# Patient Record
Sex: FEMALE | Race: BLACK OR AFRICAN AMERICAN | NOT HISPANIC OR LATINO | URBAN - METROPOLITAN AREA
[De-identification: names, ages, dates, MRNs, and addresses within clinical notes are randomized per-mention and may not be internally consistent; named-entity substitution may affect disease eponyms.]

---

## 2020-07-07 ENCOUNTER — OFFICE VISIT (OUTPATIENT)
Dept: URBAN - METROPOLITAN AREA CLINIC 113 | Facility: CLINIC | Age: 55
End: 2020-07-07

## 2020-07-25 ENCOUNTER — TELEPHONE ENCOUNTER (OUTPATIENT)
Dept: URBAN - METROPOLITAN AREA CLINIC 13 | Facility: CLINIC | Age: 55
End: 2020-07-25

## 2020-07-25 RX ORDER — POLYETHYLENE GLYCOL 3350, SODIUM CHLORIDE, SODIUM BICARBONATE AND POTASSIUM CHLORIDE WITH LEMON FLAVOR 420; 11.2; 5.72; 1.48 G/4L; G/4L; G/4L; G/4L
TAKE 1/2 GALLON AT 5:00 PM DAY BEFORE PROCEDURE, TAKE SECOND 1/2 OF GALLON 6 HRS PRIOR TO PROCEDURE POWDER, FOR SOLUTION ORAL
Qty: 1 | Refills: 0 | OUTPATIENT
Start: 2015-05-27 | End: 2015-08-10

## 2020-07-25 RX ORDER — CHOLECALCIFEROL (VITAMIN D3) 125 MCG
TAKE 1 TABLET DAILY -  PT STATES UNKNOWN DOSE CAPSULE ORAL
Refills: 0 | OUTPATIENT
End: 2016-04-28

## 2020-07-25 RX ORDER — HYDROCORTISONE ACETATE 25 MG/1
INSERT 1 SUPPOSITORY RECTALLY AT BEDTIME AS NEEDED SUPPOSITORY RECTAL
Qty: 14 | Refills: 1 | OUTPATIENT
Start: 2019-01-23 | End: 2020-07-07

## 2020-07-25 RX ORDER — NEOMYCIN AND POLYMYXIN B SULFATES AND DEXAMETHASONE 1; 3.5; 1 MG/G; MG/G; [IU]/G
APPLY 1/2 INCH RIBBON INTO AFFECTED EYE(S) AT BEDTIME OINTMENT OPHTHALMIC
Refills: 0 | OUTPATIENT
End: 2019-01-23

## 2020-07-25 RX ORDER — CHLORHEXIDINE GLUCONATE 4 %
TAKE 1 TABLET TWICE DAILY LIQUID (ML) TOPICAL
Qty: 60 | Refills: 3 | OUTPATIENT
Start: 2016-05-06 | End: 2020-07-07

## 2020-07-25 RX ORDER — FAMOTIDINE 20 MG/1
TAKE 1 TABLET DAILY AS DIRECTED TABLET, FILM COATED ORAL
Refills: 0 | OUTPATIENT
End: 2020-07-07

## 2020-07-25 RX ORDER — ESOMEPRAZOLE MAGNESIUM 20 MG/1
TAKE 1 CAPSULE DAILY CAPSULE, DELAYED RELEASE ORAL
Refills: 0 | OUTPATIENT
End: 2016-04-28

## 2020-07-25 RX ORDER — PROMETHAZINE HYDROCHLORIDE 12.5 MG/1
TAKE 1 TABLET EVERY 6 HOURS AS NEEDED TABLET ORAL
Qty: 12 | Refills: 0 | OUTPATIENT
Start: 2018-12-28 | End: 2019-01-23

## 2020-07-25 RX ORDER — OMEPRAZOLE 20 MG/1
TAKE 1 TABLET DAILY TABLET, DELAYED RELEASE ORAL
Refills: 0 | OUTPATIENT
End: 2016-06-07

## 2020-07-25 RX ORDER — CLINDAMYCIN PHOSPHATE 20 MG/G
INSERT 1 APPLICATORFUL INTRAVAGINALLY AT BEDTIME CREAM VAGINAL
Refills: 0 | OUTPATIENT
End: 2020-07-07

## 2020-07-25 RX ORDER — OMEPRAZOLE 40 MG/1
TAKE 1 CAPSULE DAILY CAPSULE, DELAYED RELEASE ORAL
Qty: 1 | Refills: 11 | OUTPATIENT
End: 2018-10-25

## 2020-07-25 RX ORDER — METRONIDAZOLE 500 MG/1
TAKE 1 TABLET TWICE DAILY PRN TABLET, FILM COATED ORAL
Refills: 0 | OUTPATIENT
End: 2018-04-20

## 2020-07-25 RX ORDER — IBUPROFEN 800 MG/1
TAKE 1 TABLET AS NEEDED TABLET ORAL
Refills: 0 | OUTPATIENT
End: 2015-05-27

## 2020-07-25 RX ORDER — SACCHAROMYCES BOULARDII 250 MG
TAKE 1 CAPSULE TWICE DAILY CAPSULE ORAL
Refills: 0 | OUTPATIENT
End: 2020-07-07

## 2020-07-25 RX ORDER — NITROFURANTOIN MONOHYDRATE/MACROCRYSTALLINE 25; 75 MG/1; MG/1
TAKE 1 CAPSULE TWICE DAILY CAPSULE ORAL
Refills: 0 | OUTPATIENT
End: 2016-05-04

## 2020-07-25 RX ORDER — ESOMEPRAZOLE MAGNESIUM 40 MG/1
TAKE 1 CAPSULE DAILY CAPSULE, DELAYED RELEASE PELLETS ORAL
Qty: 30 | Refills: 3 | OUTPATIENT
Start: 2016-04-28 | End: 2016-04-29

## 2020-07-25 RX ORDER — POLYETHYLENE GLYCOL 3350, SODIUM CHLORIDE, SODIUM BICARBONATE AND POTASSIUM CHLORIDE WITH LEMON FLAVOR 420; 11.2; 5.72; 1.48 G/4L; G/4L; G/4L; G/4L
TAKE 1/2 GALLON AT 5:00 PM DAY BEFORE PROCEDURE, TAKE SECOND 1/2 OF GALLON 6 HRS PRIOR TO PROCEDURE POWDER, FOR SOLUTION ORAL
Qty: 1 | Refills: 0 | OUTPATIENT
Start: 2018-12-28 | End: 2018-12-31

## 2020-07-25 RX ORDER — PANTOPRAZOLE SODIUM 40 MG/1
TAKE 1 TABLET DAILY TABLET, DELAYED RELEASE ORAL
Qty: 30 | Refills: 2 | OUTPATIENT
Start: 2018-10-25 | End: 2019-01-23

## 2020-07-25 RX ORDER — HYDROCODONE/ACETAMINOPHEN 5 MG-325MG
TAKE 1 TABLET EVERY 4 TO 6 HOURS AS NEEDED TABLET ORAL
Refills: 0 | OUTPATIENT
End: 2016-04-28

## 2020-07-25 RX ORDER — OMEPRAZOLE 40 MG/1
TAKE 1 CAPSULE TWICE DAILY 30 MINUTES BEFORE BREAKFAST AND DINNER CAPSULE, DELAYED RELEASE ORAL
Qty: 60 | Refills: 2 | OUTPATIENT
Start: 2016-04-28 | End: 2016-06-07

## 2020-07-25 RX ORDER — MAGNESIUM HYDROXIDE 400 MG/5ML
USE AS DIRECTED SUSPENSION ORAL
Refills: 0 | OUTPATIENT
Start: 2008-05-01 | End: 2016-04-28

## 2020-07-25 RX ORDER — POLYETHYLENE GLYCOL 3350, SODIUM CHLORIDE, SODIUM BICARBONATE AND POTASSIUM CHLORIDE WITH LEMON FLAVOR 420; 11.2; 5.72; 1.48 G/4L; G/4L; G/4L; G/4L
USE AS DIRECTED POWDER, FOR SOLUTION ORAL
Qty: 1 | Refills: 0 | OUTPATIENT
Start: 2018-10-25 | End: 2018-12-17

## 2020-07-25 RX ORDER — FLUCONAZOLE 150 MG/1
TAKE 1 TABLET 1 TIME ONLY TABLET ORAL
Refills: 0 | OUTPATIENT
End: 2017-02-07

## 2020-07-26 ENCOUNTER — TELEPHONE ENCOUNTER (OUTPATIENT)
Dept: URBAN - METROPOLITAN AREA CLINIC 13 | Facility: CLINIC | Age: 55
End: 2020-07-26

## 2020-07-26 RX ORDER — FLUCONAZOLE 150 MG/1
TAKE 1 TABLET NOW AND AGAIN IN 3 DAYS AS NEEDED TABLET ORAL
Refills: 0 | Status: ACTIVE | COMMUNITY

## 2020-07-26 RX ORDER — LATANOPROST/PF 0.005 %
DROPS OPHTHALMIC (EYE)
Qty: 2 | Refills: 0 | Status: ACTIVE | COMMUNITY
Start: 2018-06-20

## 2020-07-26 RX ORDER — FUROSEMIDE 20 MG/1
TAKE 1  TO 2 TABLETS DAILY AS NEEDED TABLET ORAL
Qty: 30 | Refills: 6 | Status: ACTIVE | COMMUNITY

## 2020-07-26 RX ORDER — TOBRAMYCIN AND DEXAMETHASONE 3; 1 MG/ML; MG/ML
SUSPENSION/ DROPS OPHTHALMIC
Qty: 10 | Refills: 0 | Status: ACTIVE | COMMUNITY
Start: 2018-07-12

## 2020-07-26 RX ORDER — CETIRIZINE HYDROCHLORIDE 10 MG/1
TAKE 1 TABLET DAILY AS NEEDED. DOSE UNKNOWN PER PT TABLET, FILM COATED ORAL
Refills: 0 | Status: ACTIVE | COMMUNITY

## 2020-07-26 RX ORDER — MOXIFLOXACIN HYDROCHLORIDE TABLETS, 400 MG 400 MG/1
TABLET, FILM COATED ORAL
Qty: 14 | Refills: 0 | Status: ACTIVE | COMMUNITY
Start: 2018-03-27

## 2020-07-26 RX ORDER — ALPRAZOLAM 0.25 MG/1
TABLET ORAL
Qty: 50 | Refills: 0 | Status: ACTIVE | COMMUNITY
Start: 2011-09-27

## 2020-07-26 RX ORDER — TRAMADOL HYDROCHLORIDE 50 MG/1
TAKE 1 TABLET EVERY 6 HOURS AS NEEDED FOR PAIN TABLET ORAL
Refills: 0 | Status: ACTIVE | COMMUNITY

## 2020-07-26 RX ORDER — CARVEDILOL 3.12 MG/1
TAKE 1 TABLET TWICE DAILY WITH MEALS TABLET, FILM COATED ORAL
Refills: 0 | Status: ACTIVE | COMMUNITY

## 2020-07-26 RX ORDER — DOCUSATE SODIUM 100 MG/1
TAKE 1 CAPSULE TWICE DAILY AS NEEDED CAPSULE ORAL
Qty: 60 | Refills: 11 | Status: ACTIVE | COMMUNITY

## 2020-07-26 RX ORDER — DOXYCYCLINE 100 MG/1
CAPSULE ORAL
Qty: 20 | Refills: 0 | Status: ACTIVE | COMMUNITY
Start: 2014-07-24

## 2020-07-26 RX ORDER — LATANOPROST/PF 0.005 %
DROPS OPHTHALMIC (EYE)
Qty: 2 | Refills: 0 | Status: ACTIVE | COMMUNITY
Start: 2018-05-09

## 2020-07-26 RX ORDER — PROMETHAZINE HYDROCHLORIDE 12.5 MG/1
TAKE 1 TABLET EVERY 6 HOURS AS NEEDED FOR NAUSEA TABLET ORAL
Qty: 30 | Refills: 1 | Status: ACTIVE | COMMUNITY
Start: 2016-04-28

## 2020-07-26 RX ORDER — CLOTRIMAZOLE AND BETAMETHASONE DIPROPIONATE 10; .5 MG/G; MG/G
CREAM TOPICAL
Qty: 60 | Refills: 0 | Status: ACTIVE | COMMUNITY
Start: 2018-05-08

## 2020-07-26 RX ORDER — NEOMYCIN AND POLYMYXIN B SULFATES AND DEXAMETHASONE 1; 3.5; 1 MG/G; MG/G; [IU]/G
OINTMENT OPHTHALMIC
Qty: 4 | Refills: 0 | Status: ACTIVE | COMMUNITY
Start: 2018-07-24

## 2020-07-26 RX ORDER — CLINDAMYCIN PHOSPHATE 20 MG/G
CREAM VAGINAL
Qty: 40 | Refills: 0 | Status: ACTIVE | COMMUNITY
Start: 2018-05-22

## 2020-07-26 RX ORDER — PRASTERONE 6.5 MG/1
INSERT 1 SUPP BEDTIME INSERT VAGINAL
Refills: 0 | Status: ACTIVE | COMMUNITY

## 2020-07-26 RX ORDER — METRONIDAZOLE 500 MG/1
TABLET ORAL
Qty: 21 | Refills: 0 | Status: ACTIVE | COMMUNITY
Start: 2012-05-21

## 2020-07-26 RX ORDER — MOXIFLOXACIN HYDROCHLORIDE TABLETS, 400 MG 400 MG/1
TK 1 T PO QD TABLET, FILM COATED ORAL
Qty: 14 | Refills: 0 | Status: ACTIVE | COMMUNITY
Start: 2018-03-24

## 2020-07-26 RX ORDER — HYDROCHLOROTHIAZIDE 25 MG/1
TAKE 1 TABLET DAILY TABLET ORAL
Refills: 0 | Status: ACTIVE | COMMUNITY

## 2020-07-26 RX ORDER — LINEZOLID 600 MG/1
TABLET ORAL
Qty: 28 | Refills: 0 | Status: ACTIVE | COMMUNITY
Start: 2018-04-03

## 2020-07-26 RX ORDER — CLINDAMYCIN PHOSPHATE 20 MG/G
CREAM VAGINAL
Qty: 40 | Refills: 0 | Status: ACTIVE | COMMUNITY
Start: 2018-07-15

## 2020-07-26 RX ORDER — CLOTRIMAZOLE AND BETAMETHASONE DIPROPIONATE 10; .5 MG/G; MG/G
CREAM TOPICAL
Qty: 45 | Refills: 0 | Status: ACTIVE | COMMUNITY
Start: 2011-12-02

## 2020-07-26 RX ORDER — DICYCLOMINE HYDROCHLORIDE 10 MG/1
TAKE 1 CAPSULE EVERY 6 HOURS PRN ABDOMINAL PAIN CAPSULE ORAL
Qty: 60 | Refills: 3 | Status: ACTIVE | COMMUNITY
Start: 2020-07-07

## 2020-07-26 RX ORDER — METFORMIN HYDROCHLORIDE 500 MG/1
TAKE 1 TABLET TWICE DAILY WITH FOOD TABLET, COATED ORAL
Refills: 0 | Status: ACTIVE | COMMUNITY

## 2020-07-26 RX ORDER — FUROSEMIDE 20 MG/1
TABLET ORAL
Qty: 60 | Refills: 0 | Status: ACTIVE | COMMUNITY
Start: 2018-05-22

## 2020-07-26 RX ORDER — FLUCONAZOLE 150 MG/1
TABLET ORAL
Qty: 4 | Refills: 0 | Status: ACTIVE | COMMUNITY
Start: 2014-06-25

## 2020-07-26 RX ORDER — HYDROCHLOROTHIAZIDE 25 MG/1
TABLET ORAL
Qty: 30 | Refills: 0 | Status: ACTIVE | COMMUNITY
Start: 2011-12-02

## 2020-07-26 RX ORDER — HYDROCHLOROTHIAZIDE 25 MG/1
TABLET ORAL
Qty: 30 | Refills: 0 | Status: ACTIVE | COMMUNITY
Start: 2014-06-21

## 2020-10-14 ENCOUNTER — OFFICE VISIT (OUTPATIENT)
Dept: URBAN - METROPOLITAN AREA CLINIC 113 | Facility: CLINIC | Age: 55
End: 2020-10-14

## 2021-10-26 ENCOUNTER — OFFICE VISIT (OUTPATIENT)
Dept: URBAN - METROPOLITAN AREA CLINIC 113 | Facility: CLINIC | Age: 56
End: 2021-10-26
Payer: COMMERCIAL

## 2021-10-26 ENCOUNTER — WEB ENCOUNTER (OUTPATIENT)
Dept: URBAN - METROPOLITAN AREA CLINIC 113 | Facility: CLINIC | Age: 56
End: 2021-10-26

## 2021-10-26 VITALS
TEMPERATURE: 97.3 F | BODY MASS INDEX: 48.82 KG/M2 | RESPIRATION RATE: 20 BRPM | WEIGHT: 293 LBS | DIASTOLIC BLOOD PRESSURE: 81 MMHG | HEIGHT: 65 IN | SYSTOLIC BLOOD PRESSURE: 172 MMHG | HEART RATE: 74 BPM

## 2021-10-26 DIAGNOSIS — K58.2 IRRITABLE BOWEL SYNDROME WITH BOTH CONSTIPATION AND DIARRHEA: ICD-10-CM

## 2021-10-26 PROBLEM — 10743008: Status: ACTIVE | Noted: 2021-10-26

## 2021-10-26 PROCEDURE — 99213 OFFICE O/P EST LOW 20 MIN: CPT | Performed by: NURSE PRACTITIONER

## 2021-10-26 RX ORDER — DOCUSATE SODIUM 100 MG/1
TAKE 1 CAPSULE TWICE DAILY AS NEEDED CAPSULE ORAL
Qty: 60 | Refills: 11 | Status: ACTIVE | COMMUNITY

## 2021-10-26 RX ORDER — MOXIFLOXACIN HYDROCHLORIDE TABLETS, 400 MG 400 MG/1
TK 1 T PO QD TABLET, FILM COATED ORAL
Qty: 14 | Refills: 0 | Status: ON HOLD | COMMUNITY
Start: 2018-03-24

## 2021-10-26 RX ORDER — PRASTERONE 6.5 MG/1
_INSERT 1 SUPP BEDTIME INSERT VAGINAL
Refills: 0 | Status: ACTIVE | COMMUNITY

## 2021-10-26 RX ORDER — CLINDAMYCIN PHOSPHATE 20 MG/G
CREAM VAGINAL
Qty: 40 | Refills: 0 | Status: ACTIVE | COMMUNITY
Start: 2018-05-22

## 2021-10-26 RX ORDER — HYDROCHLOROTHIAZIDE 25 MG/1
TAKE 1 TABLET DAILY TABLET ORAL
Refills: 0 | Status: ACTIVE | COMMUNITY

## 2021-10-26 RX ORDER — METFORMIN HYDROCHLORIDE 500 MG/1
TAKE 1 TABLET TWICE DAILY WITH FOOD TABLET, COATED ORAL
Refills: 0 | Status: ACTIVE | COMMUNITY

## 2021-10-26 RX ORDER — LINEZOLID 600 MG/1
TABLET ORAL
Qty: 28 | Refills: 0 | Status: ON HOLD | COMMUNITY
Start: 2018-04-03

## 2021-10-26 RX ORDER — FUROSEMIDE 20 MG/1
TAKE 1  TO 2 TABLETS DAILY AS NEEDED TABLET ORAL
Qty: 30 | Refills: 6 | Status: ACTIVE | COMMUNITY

## 2021-10-26 RX ORDER — CETIRIZINE HYDROCHLORIDE 10 MG/1
TAKE 1 TABLET DAILY AS NEEDED. DOSE UNKNOWN PER PT TABLET, FILM COATED ORAL
Refills: 0 | Status: ACTIVE | COMMUNITY

## 2021-10-26 RX ORDER — METRONIDAZOLE 500 MG/1
TABLET ORAL
Qty: 21 | Refills: 0 | Status: ACTIVE | COMMUNITY
Start: 2012-05-21

## 2021-10-26 RX ORDER — PROMETHAZINE HYDROCHLORIDE 12.5 MG/1
TAKE 1 TABLET EVERY 6 HOURS AS NEEDED FOR NAUSEA TABLET ORAL
Qty: 30 | Refills: 1 | Status: ACTIVE | COMMUNITY
Start: 2016-04-28

## 2021-10-26 RX ORDER — CARVEDILOL 3.12 MG/1
TAKE 1 TABLET TWICE DAILY WITH MEALS TABLET, FILM COATED ORAL
Refills: 0 | Status: ACTIVE | COMMUNITY

## 2021-10-26 RX ORDER — TOBRAMYCIN AND DEXAMETHASONE 3; 1 MG/ML; MG/ML
SUSPENSION/ DROPS OPHTHALMIC
Qty: 10 | Refills: 0 | Status: ON HOLD | COMMUNITY
Start: 2018-07-12

## 2021-10-26 RX ORDER — TRAMADOL HYDROCHLORIDE 50 MG/1
TAKE 1 TABLET EVERY 6 HOURS AS NEEDED FOR PAIN TABLET ORAL
Refills: 0 | Status: ACTIVE | COMMUNITY

## 2021-10-26 RX ORDER — DOXYCYCLINE 100 MG/1
CAPSULE ORAL
Qty: 20 | Refills: 0 | Status: ON HOLD | COMMUNITY
Start: 2014-07-24

## 2021-10-26 RX ORDER — DICYCLOMINE HYDROCHLORIDE 10 MG/1
TAKE 1 CAPSULE EVERY 6 HOURS PRN ABDOMINAL PAIN CAPSULE ORAL
Qty: 60 | Refills: 3 | Status: ACTIVE | COMMUNITY
Start: 2020-07-07

## 2021-10-26 RX ORDER — FLUOXETINE HYDROCHLORIDE 10 MG/1
1 TABLET TABLET ORAL ONCE A DAY
Status: ACTIVE | COMMUNITY

## 2021-10-26 RX ORDER — CLOTRIMAZOLE AND BETAMETHASONE DIPROPIONATE 10; .5 MG/G; MG/G
CREAM TOPICAL
Qty: 45 | Refills: 0 | Status: ACTIVE | COMMUNITY
Start: 2011-12-02

## 2021-10-26 RX ORDER — FLUCONAZOLE 150 MG/1
TAKE 1 TABLET NOW AND AGAIN IN 3 DAYS AS NEEDED TABLET ORAL
Refills: 0 | Status: ACTIVE | COMMUNITY

## 2021-10-26 RX ORDER — FLUCONAZOLE 150 MG/1
TABLET ORAL
Qty: 4 | Refills: 0 | Status: ON HOLD | COMMUNITY
Start: 2014-06-25

## 2021-10-26 RX ORDER — NEOMYCIN AND POLYMYXIN B SULFATES AND DEXAMETHASONE 1; 3.5; 1 MG/G; MG/G; [IU]/G
OINTMENT OPHTHALMIC
Qty: 4 | Refills: 0 | Status: ON HOLD | COMMUNITY
Start: 2018-07-24

## 2021-10-26 RX ORDER — LATANOPROST/PF 0.005 %
DROPS OPHTHALMIC (EYE)
Qty: 2 | Refills: 0 | Status: ON HOLD | COMMUNITY
Start: 2018-05-09

## 2021-10-26 RX ORDER — ALPRAZOLAM 0.25 MG/1
1 TABLET TABLET ORAL
Refills: 0 | Status: ON HOLD | COMMUNITY
Start: 2011-09-27

## 2021-10-26 NOTE — HPI-TODAY'S VISIT:
57 yo woman presenting for same day appointment regarding "IBS flare."  She has a past medical history of GERD, nausea and vomiting, adenomatous colon polyps due surveillance in 2023, chronic abdominal pain and constiaption associated with a functional bowel disorder presenting for long interval follow up.  She was last seen in the office in July 2020 for chronic abdominal complaints consistent with IBS. She was to resume daily fiber supplement, begin a  daily probiotic, use MiraLAX daily, and avoid dairy products. She was provided dicyclomine to use as needed. Additionally, she was asked to complete a CBC to trend hemoglobin for history of anemia.  She tells me that over the last 6 months, she has been having worsening mid abdominal pain prompting the urge for a bowel movement. She tells me that she can have dry heaves associated with the abdominal pain. She can also have loose stools with incontinence. There is no dysphagia. No heartburn, but she can have a reflux sensation. She is not taking famotine. There is nausea, that has been ongoing daily for the last 2 weeks, usually all day. She has had dry heaves approximately every day for the last 2 weeks. Some days she does not have retching. No vomiting of food contents or bile. She has bowel movements irregularly. She has days without a bowel movements, followed by days with loose stools, urgency and even incontinence. No blood per rectum. There is no fever or chills. No weight loss. Appetite is fair. Dicyclomine does help with abdominal pain. She had stopped taking her Benefiber for sometime. She did resume this 2 months ago, but remains inconsistent. She takes magnesium citrate as needed.

## 2022-01-18 ENCOUNTER — OFFICE VISIT (OUTPATIENT)
Dept: URBAN - METROPOLITAN AREA CLINIC 113 | Facility: CLINIC | Age: 57
End: 2022-01-18

## 2022-02-22 ENCOUNTER — OFFICE VISIT (OUTPATIENT)
Dept: URBAN - METROPOLITAN AREA CLINIC 113 | Facility: CLINIC | Age: 57
End: 2022-02-22
Payer: COMMERCIAL

## 2022-02-22 VITALS
SYSTOLIC BLOOD PRESSURE: 181 MMHG | HEART RATE: 86 BPM | TEMPERATURE: 97.5 F | HEIGHT: 65 IN | BODY MASS INDEX: 48.82 KG/M2 | DIASTOLIC BLOOD PRESSURE: 91 MMHG | WEIGHT: 293 LBS

## 2022-02-22 DIAGNOSIS — K21.9 GERD WITHOUT ESOPHAGITIS: ICD-10-CM

## 2022-02-22 DIAGNOSIS — Z86.010 HISTORY OF ADENOMATOUS POLYP OF COLON: ICD-10-CM

## 2022-02-22 DIAGNOSIS — K59.01 SLOW TRANSIT CONSTIPATION: ICD-10-CM

## 2022-02-22 DIAGNOSIS — R14.0 BLOATING: ICD-10-CM

## 2022-02-22 PROCEDURE — 99213 OFFICE O/P EST LOW 20 MIN: CPT | Performed by: INTERNAL MEDICINE

## 2022-02-22 RX ORDER — CLOTRIMAZOLE AND BETAMETHASONE DIPROPIONATE 10; .5 MG/G; MG/G
CREAM TOPICAL
Qty: 45 | Refills: 0 | Status: ACTIVE | COMMUNITY
Start: 2011-12-02

## 2022-02-22 RX ORDER — FLUCONAZOLE 150 MG/1
TAKE 1 TABLET NOW AND AGAIN IN 3 DAYS AS NEEDED TABLET ORAL
Refills: 0 | Status: ACTIVE | COMMUNITY

## 2022-02-22 RX ORDER — CARVEDILOL 3.12 MG/1
TAKE 1 TABLET TWICE DAILY WITH MEALS TABLET, FILM COATED ORAL
Refills: 0 | Status: ACTIVE | COMMUNITY

## 2022-02-22 RX ORDER — MOXIFLOXACIN HYDROCHLORIDE TABLETS, 400 MG 400 MG/1
TK 1 T PO QD TABLET, FILM COATED ORAL
Qty: 14 | Refills: 0 | Status: ON HOLD | COMMUNITY
Start: 2018-03-24

## 2022-02-22 RX ORDER — CETIRIZINE HYDROCHLORIDE 10 MG/1
TAKE 1 TABLET DAILY AS NEEDED. DOSE UNKNOWN PER PT TABLET, FILM COATED ORAL
Refills: 0 | Status: ACTIVE | COMMUNITY

## 2022-02-22 RX ORDER — FLUCONAZOLE 150 MG/1
TABLET ORAL
Qty: 4 | Refills: 0 | Status: ON HOLD | COMMUNITY
Start: 2014-06-25

## 2022-02-22 RX ORDER — DOCUSATE SODIUM 100 MG/1
TAKE 1 CAPSULE TWICE DAILY AS NEEDED CAPSULE ORAL
Qty: 60 | Refills: 11 | Status: ACTIVE | COMMUNITY

## 2022-02-22 RX ORDER — NEOMYCIN AND POLYMYXIN B SULFATES AND DEXAMETHASONE 1; 3.5; 1 MG/G; MG/G; [IU]/G
OINTMENT OPHTHALMIC
Qty: 4 | Refills: 0 | Status: ON HOLD | COMMUNITY
Start: 2018-07-24

## 2022-02-22 RX ORDER — FLUOXETINE HYDROCHLORIDE 10 MG/1
1 TABLET TABLET ORAL ONCE A DAY
Status: ACTIVE | COMMUNITY

## 2022-02-22 RX ORDER — NAPROXEN SODIUM 220 MG/1
1 TABLET WITH FOOD OR MILK AS NEEDED TABLET ORAL
Status: ACTIVE | COMMUNITY

## 2022-02-22 RX ORDER — TOBRAMYCIN AND DEXAMETHASONE 3; 1 MG/ML; MG/ML
SUSPENSION/ DROPS OPHTHALMIC
Qty: 10 | Refills: 0 | Status: ON HOLD | COMMUNITY
Start: 2018-07-12

## 2022-02-22 RX ORDER — OMEPRAZOLE 40 MG/1
1 CAPSULE 30 MINUTES BEFORE MORNING MEAL CAPSULE, DELAYED RELEASE ORAL ONCE A DAY
Qty: 30 | Refills: 5 | OUTPATIENT
Start: 2022-02-22

## 2022-02-22 RX ORDER — DOXYCYCLINE 100 MG/1
CAPSULE ORAL
Qty: 20 | Refills: 0 | Status: ON HOLD | COMMUNITY
Start: 2014-07-24

## 2022-02-22 RX ORDER — DICYCLOMINE HYDROCHLORIDE 10 MG/1
TAKE 1 CAPSULE EVERY 6 HOURS PRN ABDOMINAL PAIN CAPSULE ORAL
Qty: 60 | Refills: 3 | Status: ACTIVE | COMMUNITY
Start: 2020-07-07

## 2022-02-22 RX ORDER — WHEAT DEXTRIN 3 G/3.5 G
AS DIRECTED POWDER (GRAM) ORAL
Status: ACTIVE | COMMUNITY

## 2022-02-22 RX ORDER — ALPRAZOLAM 0.25 MG/1
1 TABLET TABLET ORAL
Refills: 0 | Status: ON HOLD | COMMUNITY
Start: 2011-09-27

## 2022-02-22 RX ORDER — PRASTERONE 6.5 MG/1
_INSERT 1 SUPP BEDTIME INSERT VAGINAL
Refills: 0 | Status: ACTIVE | COMMUNITY

## 2022-02-22 RX ORDER — LATANOPROST/PF 0.005 %
DROPS OPHTHALMIC (EYE)
Qty: 2 | Refills: 0 | Status: ON HOLD | COMMUNITY
Start: 2018-05-09

## 2022-02-22 RX ORDER — TRAMADOL HYDROCHLORIDE 50 MG/1
TAKE 1 TABLET EVERY 6 HOURS AS NEEDED FOR PAIN TABLET ORAL
Refills: 0 | Status: ACTIVE | COMMUNITY

## 2022-02-22 RX ORDER — METRONIDAZOLE 500 MG/1
TABLET ORAL
Qty: 21 | Refills: 0 | Status: ACTIVE | COMMUNITY
Start: 2012-05-21

## 2022-02-22 RX ORDER — FUROSEMIDE 20 MG/1
TAKE 1  TO 2 TABLETS DAILY AS NEEDED TABLET ORAL
Qty: 30 | Refills: 6 | Status: ACTIVE | COMMUNITY

## 2022-02-22 RX ORDER — LINEZOLID 600 MG/1
TABLET ORAL
Qty: 28 | Refills: 0 | Status: ON HOLD | COMMUNITY
Start: 2018-04-03

## 2022-02-22 RX ORDER — HYDROCHLOROTHIAZIDE 25 MG/1
TAKE 1 TABLET DAILY TABLET ORAL
Refills: 0 | Status: ACTIVE | COMMUNITY

## 2022-02-22 RX ORDER — PROMETHAZINE HYDROCHLORIDE 12.5 MG/1
TAKE 1 TABLET EVERY 6 HOURS AS NEEDED FOR NAUSEA TABLET ORAL
Qty: 30 | Refills: 1 | Status: ACTIVE | COMMUNITY
Start: 2016-04-28

## 2022-02-22 RX ORDER — POLYETHYLENE GLYCOL 3350 17 G/17G
AS DIRECTED POWDER, FOR SOLUTION ORAL
Status: ACTIVE | COMMUNITY

## 2022-02-22 RX ORDER — CLINDAMYCIN PHOSPHATE 20 MG/G
CREAM VAGINAL
Qty: 40 | Refills: 0 | Status: ACTIVE | COMMUNITY
Start: 2018-05-22

## 2022-02-22 RX ORDER — METFORMIN HYDROCHLORIDE 500 MG/1
TAKE 1 TABLET TWICE DAILY WITH FOOD TABLET, COATED ORAL
Refills: 0 | Status: ACTIVE | COMMUNITY

## 2022-02-22 NOTE — HPI-TODAY'S VISIT:
57-year-old with a history of GERD, nausea and vomiting, adenomatous colon polyps due for surveillance in 2023, chronic abdominal pain and constipation associated with functional bowel disorder. For the last 2 months she has had increased reflux symptoms she states.  She gets discomfort in the epigastric region.  Indigestion.  Sometimes she gags and at his slight bile.  She denies any heartburn or dysphagia.  She's had no regurgitation.  She occasionally has nausea but no vomiting.  She is using Pepcid twice a day.  She has had some constipation but doesn't move her bowels about every other day.  She is getting back on her MiraLAX and restarted that.  She plans to restart her fiber as well.  There's been no bright red blood per rectum or melena.  She did have one time where she had some right red blood on the toilet paper when she wiped but never in the toilet bowl.  She has a lot of stress recently.  Sometimes she can have some urgency with her stools.  She has a lot of gas bloating and flatus. Blood workup 2//22 revealed a hemoglobin 11.1 WBC is 7.3 and platelet count of 200,000.  Sodium is 138 potassium 4.3 BUN 17 creatinine 0.83.  AST 14 ALT 20 alkaline phosphatase is 58 and bili 0.2.  TSH is 2.39.  Hemoglobin A1c is 8.8.

## 2022-02-22 NOTE — HPI-OTHER HISTORIES
Colonoscopy on 12/31/2018 revealed a moderate redundant colon, an adult colonoscope was used.  She had pancolonic diverticuli, no colon polyps.  EGD on 12/17/2018 revealed a normal Z-line and esophagus at 40 cm a small hiatal hernia.  Mild erythema of the gastric antrum and body.  Normal duodenum.  Stomach biopsies revealed chemical gastropathy negative for H. pylori.  Duodenal biopsies were normal.  Esophageal biopsies revealed reflux changes with no eosinophilic esophagitis.

## 2022-03-16 ENCOUNTER — ERX REFILL RESPONSE (OUTPATIENT)
Dept: URBAN - METROPOLITAN AREA CLINIC 113 | Facility: CLINIC | Age: 57
End: 2022-03-16

## 2022-03-16 RX ORDER — OMEPRAZOLE 40 MG/1
TAKE 1 CAPSULE BY MOUTH EVERY DAY 30 MINUTES BEFORE MORNING MEAL FOR 30 DAYS CAPSULE, DELAYED RELEASE ORAL
Qty: 30 CAPSULE | Refills: 6 | OUTPATIENT

## 2022-03-16 RX ORDER — OMEPRAZOLE 40 MG/1
1 CAPSULE 30 MINUTES BEFORE MORNING MEAL CAPSULE, DELAYED RELEASE ORAL ONCE A DAY
Qty: 30 | Refills: 5 | OUTPATIENT

## 2022-04-22 ENCOUNTER — OFFICE VISIT (OUTPATIENT)
Dept: URBAN - METROPOLITAN AREA CLINIC 113 | Facility: CLINIC | Age: 57
End: 2022-04-22

## 2022-06-06 ENCOUNTER — OFFICE VISIT (OUTPATIENT)
Dept: URBAN - METROPOLITAN AREA CLINIC 113 | Facility: CLINIC | Age: 57
End: 2022-06-06
Payer: COMMERCIAL

## 2022-06-06 VITALS
BODY MASS INDEX: 48.82 KG/M2 | DIASTOLIC BLOOD PRESSURE: 75 MMHG | HEART RATE: 98 BPM | SYSTOLIC BLOOD PRESSURE: 157 MMHG | WEIGHT: 293 LBS | TEMPERATURE: 98.9 F | HEIGHT: 65 IN

## 2022-06-06 DIAGNOSIS — K59.01 SLOW TRANSIT CONSTIPATION: ICD-10-CM

## 2022-06-06 DIAGNOSIS — Z86.010 HISTORY OF ADENOMATOUS POLYP OF COLON: ICD-10-CM

## 2022-06-06 DIAGNOSIS — R14.0 BLOATING: ICD-10-CM

## 2022-06-06 DIAGNOSIS — K21.9 GERD WITHOUT ESOPHAGITIS: ICD-10-CM

## 2022-06-06 DIAGNOSIS — K59.09 CHANGE IN BOWEL MOVEMENTS INTERMITTENT CONSTIPATION. URGENCY IN THE MORNING.: ICD-10-CM

## 2022-06-06 PROCEDURE — 99213 OFFICE O/P EST LOW 20 MIN: CPT | Performed by: INTERNAL MEDICINE

## 2022-06-06 RX ORDER — MOXIFLOXACIN HYDROCHLORIDE TABLETS, 400 MG 400 MG/1
TK 1 T PO QD TABLET, FILM COATED ORAL
Qty: 14 | Refills: 0 | Status: ON HOLD | COMMUNITY
Start: 2018-03-24

## 2022-06-06 RX ORDER — PROMETHAZINE HYDROCHLORIDE 12.5 MG/1
TAKE 1 TABLET EVERY 6 HOURS AS NEEDED FOR NAUSEA TABLET ORAL
Qty: 30 | Refills: 1 | Status: ACTIVE | COMMUNITY
Start: 2016-04-28

## 2022-06-06 RX ORDER — PRASTERONE 6.5 MG/1
_INSERT 1 SUPP BEDTIME INSERT VAGINAL
Refills: 0 | Status: ACTIVE | COMMUNITY

## 2022-06-06 RX ORDER — CLINDAMYCIN PHOSPHATE 20 MG/G
CREAM VAGINAL
Qty: 40 | Refills: 0 | Status: ACTIVE | COMMUNITY
Start: 2018-05-22

## 2022-06-06 RX ORDER — LINEZOLID 600 MG/1
TABLET ORAL
Qty: 28 | Refills: 0 | Status: ON HOLD | COMMUNITY
Start: 2018-04-03

## 2022-06-06 RX ORDER — NEOMYCIN AND POLYMYXIN B SULFATES AND DEXAMETHASONE 1; 3.5; 1 MG/G; MG/G; [IU]/G
OINTMENT OPHTHALMIC
Qty: 4 | Refills: 0 | Status: ON HOLD | COMMUNITY
Start: 2018-07-24

## 2022-06-06 RX ORDER — DICYCLOMINE HYDROCHLORIDE 10 MG/1
TAKE 1 CAPSULE EVERY 6 HOURS PRN ABDOMINAL PAIN CAPSULE ORAL
Qty: 60 | Refills: 3 | Status: ACTIVE | COMMUNITY
Start: 2020-07-07

## 2022-06-06 RX ORDER — OMEPRAZOLE 40 MG/1
TAKE 1 CAPSULE BY MOUTH EVERY DAY 30 MINUTES BEFORE MORNING MEAL FOR 30 DAYS CAPSULE, DELAYED RELEASE ORAL
Qty: 30 CAPSULE | Refills: 6 | Status: ACTIVE | COMMUNITY

## 2022-06-06 RX ORDER — ALPRAZOLAM 0.25 MG/1
1 TABLET TABLET ORAL
Refills: 0 | Status: ON HOLD | COMMUNITY
Start: 2011-09-27

## 2022-06-06 RX ORDER — HYDROCHLOROTHIAZIDE 25 MG/1
TAKE 1 TABLET DAILY TABLET ORAL
Refills: 0 | Status: ACTIVE | COMMUNITY

## 2022-06-06 RX ORDER — CARVEDILOL 3.12 MG/1
TAKE 1 TABLET TWICE DAILY WITH MEALS TABLET, FILM COATED ORAL
Refills: 0 | Status: ACTIVE | COMMUNITY

## 2022-06-06 RX ORDER — DOXYCYCLINE 100 MG/1
CAPSULE ORAL
Qty: 20 | Refills: 0 | Status: ON HOLD | COMMUNITY
Start: 2014-07-24

## 2022-06-06 RX ORDER — DOCUSATE SODIUM 100 MG/1
TAKE 1 CAPSULE TWICE DAILY AS NEEDED CAPSULE ORAL
Qty: 60 | Refills: 11 | Status: ACTIVE | COMMUNITY

## 2022-06-06 RX ORDER — CLOTRIMAZOLE AND BETAMETHASONE DIPROPIONATE 10; .5 MG/G; MG/G
CREAM TOPICAL
Qty: 45 | Refills: 0 | Status: ACTIVE | COMMUNITY
Start: 2011-12-02

## 2022-06-06 RX ORDER — FLUOXETINE HYDROCHLORIDE 10 MG/1
1 TABLET TABLET ORAL ONCE A DAY
Status: ACTIVE | COMMUNITY

## 2022-06-06 RX ORDER — NAPROXEN SODIUM 220 MG/1
1 TABLET WITH FOOD OR MILK AS NEEDED TABLET ORAL
Status: ON HOLD | COMMUNITY

## 2022-06-06 RX ORDER — WHEAT DEXTRIN 3 G/3.5 G
AS DIRECTED POWDER (GRAM) ORAL
Status: ACTIVE | COMMUNITY

## 2022-06-06 RX ORDER — TRAMADOL HYDROCHLORIDE 50 MG/1
TAKE 1 TABLET EVERY 6 HOURS AS NEEDED FOR PAIN TABLET ORAL
Refills: 0 | Status: ON HOLD | COMMUNITY

## 2022-06-06 RX ORDER — POLYETHYLENE GLYCOL 3350 17 G/17G
AS DIRECTED POWDER, FOR SOLUTION ORAL
Status: ACTIVE | COMMUNITY

## 2022-06-06 RX ORDER — LATANOPROST/PF 0.005 %
DROPS OPHTHALMIC (EYE)
Qty: 2 | Refills: 0 | Status: ON HOLD | COMMUNITY
Start: 2018-05-09

## 2022-06-06 RX ORDER — CETIRIZINE HYDROCHLORIDE 10 MG/1
TAKE 1 TABLET DAILY AS NEEDED. DOSE UNKNOWN PER PT TABLET, FILM COATED ORAL
Refills: 0 | Status: ACTIVE | COMMUNITY

## 2022-06-06 RX ORDER — FUROSEMIDE 20 MG/1
TAKE 1  TO 2 TABLETS DAILY AS NEEDED TABLET ORAL
Qty: 30 | Refills: 6 | Status: ACTIVE | COMMUNITY

## 2022-06-06 RX ORDER — METFORMIN HYDROCHLORIDE 500 MG/1
TAKE 1 TABLET TWICE DAILY WITH FOOD TABLET, COATED ORAL
Refills: 0 | Status: ACTIVE | COMMUNITY

## 2022-06-06 RX ORDER — TOBRAMYCIN AND DEXAMETHASONE 3; 1 MG/ML; MG/ML
SUSPENSION/ DROPS OPHTHALMIC
Qty: 10 | Refills: 0 | Status: ON HOLD | COMMUNITY
Start: 2018-07-12

## 2022-06-06 RX ORDER — METRONIDAZOLE 500 MG/1
TABLET ORAL
Qty: 21 | Refills: 0 | Status: ON HOLD | COMMUNITY
Start: 2012-05-21

## 2022-06-06 RX ORDER — FLUCONAZOLE 150 MG/1
TAKE 1 TABLET NOW AND AGAIN IN 3 DAYS AS NEEDED TABLET ORAL
Refills: 0 | Status: ACTIVE | COMMUNITY

## 2022-06-06 RX ORDER — FLUCONAZOLE 150 MG/1
TABLET ORAL
Qty: 4 | Refills: 0 | Status: ON HOLD | COMMUNITY
Start: 2014-06-25

## 2022-06-06 NOTE — HPI-TODAY'S VISIT:
57-year-old with a history of GERD, nausea and vomiting, adenomatous colon polyps due for surveillance in 2023, chronic abdominal pain and constipation associated with functional bowel Disorder. She did not go on a dairy free diet.  She still has lots of gas and bloating.  Her heartburn symptoms are better on the omeprazole 40 mg by mouth daily.  She currently denies any heartburn.  There is no dysphagia.  She does occasionally have some nausea in the mornings and some regurgitation.  She has 1 or 2 bouts per day and has been no blood or melena.  His been no vomiting. Blood workup 2//22 revealed a hemoglobin 11.1 WBC is 7.3 and platelet count of 200,000.  Sodium is 138 potassium 4.3 BUN 17 creatinine 0.83.  AST 14 ALT 20 alkaline phosphatase is 58 and bili 0.2.  TSH is 2.39.  Hemoglobin A1c is 8.8.

## 2022-06-09 ENCOUNTER — OFFICE VISIT (OUTPATIENT)
Dept: URBAN - METROPOLITAN AREA CLINIC 113 | Facility: CLINIC | Age: 57
End: 2022-06-09

## 2022-08-17 ENCOUNTER — OFFICE VISIT (OUTPATIENT)
Dept: URBAN - METROPOLITAN AREA CLINIC 113 | Facility: CLINIC | Age: 57
End: 2022-08-17

## 2022-11-10 ENCOUNTER — OFFICE VISIT (OUTPATIENT)
Dept: URBAN - METROPOLITAN AREA CLINIC 113 | Facility: CLINIC | Age: 57
End: 2022-11-10

## 2023-01-05 ENCOUNTER — OFFICE VISIT (OUTPATIENT)
Dept: URBAN - METROPOLITAN AREA CLINIC 113 | Facility: CLINIC | Age: 58
End: 2023-01-05

## 2023-01-05 NOTE — HPI-TODAY'S VISIT:
58-year-old with a history of GERD, nausea and vomiting, adenomatous colon polyps due for surveillance in 2023, chronic abdominal pain and constipation associated with functional bowel Disorder. She did not go on a dairy free diet.  She still has lots of gas and bloating.  Her heartburn symptoms are better on the omeprazole 40 mg by mouth daily.  She currently denies any heartburn.  There is no dysphagia.  She does occasionally have some nausea in the mornings and some regurgitation.  She has 1 or 2 bouts per day and has been no blood or melena.  His been no vomiting. Blood workup 2//22 revealed a hemoglobin 11.1 WBC is 7.3 and platelet count of 200,000.  Sodium is 138 potassium 4.3 BUN 17 creatinine 0.83.  AST 14 ALT 20 alkaline phosphatase is 58 and bili 0.2.  TSH is 2.39.  Hemoglobin A1c is 8.8.

## 2023-02-24 ENCOUNTER — OFFICE VISIT (OUTPATIENT)
Dept: URBAN - METROPOLITAN AREA CLINIC 113 | Facility: CLINIC | Age: 58
End: 2023-02-24
Payer: COMMERCIAL

## 2023-02-24 ENCOUNTER — LAB OUTSIDE AN ENCOUNTER (OUTPATIENT)
Dept: URBAN - METROPOLITAN AREA CLINIC 113 | Facility: CLINIC | Age: 58
End: 2023-02-24

## 2023-02-24 VITALS
WEIGHT: 293 LBS | BODY MASS INDEX: 48.82 KG/M2 | HEIGHT: 65 IN | TEMPERATURE: 97.8 F | HEART RATE: 81 BPM | RESPIRATION RATE: 16 BRPM | SYSTOLIC BLOOD PRESSURE: 189 MMHG | DIASTOLIC BLOOD PRESSURE: 93 MMHG

## 2023-02-24 DIAGNOSIS — K59.01 SLOW TRANSIT CONSTIPATION: ICD-10-CM

## 2023-02-24 DIAGNOSIS — R14.0 BLOATING: ICD-10-CM

## 2023-02-24 DIAGNOSIS — K21.9 GERD WITHOUT ESOPHAGITIS: ICD-10-CM

## 2023-02-24 DIAGNOSIS — K58.1 IRRITABLE BOWEL SYNDROME WITH CONSTIPATION: ICD-10-CM

## 2023-02-24 DIAGNOSIS — Z86.010 HISTORY OF ADENOMATOUS POLYP OF COLON: ICD-10-CM

## 2023-02-24 DIAGNOSIS — R10.13 EPIGASTRIC PAIN: ICD-10-CM

## 2023-02-24 PROBLEM — 35298007: Status: ACTIVE | Noted: 2022-02-22

## 2023-02-24 PROBLEM — 116289008: Status: ACTIVE | Noted: 2022-02-22

## 2023-02-24 PROBLEM — 429047008: Status: ACTIVE | Noted: 2022-02-21

## 2023-02-24 PROBLEM — 266435005: Status: ACTIVE | Noted: 2022-02-21

## 2023-02-24 PROBLEM — 440630006: Status: ACTIVE | Noted: 2023-02-24

## 2023-02-24 PROBLEM — 79922009: Status: ACTIVE | Noted: 2023-02-24

## 2023-02-24 PROCEDURE — 99214 OFFICE O/P EST MOD 30 MIN: CPT | Performed by: INTERNAL MEDICINE

## 2023-02-24 RX ORDER — POLYETHYLENE GLYCOL 3350 17 G/17G
AS DIRECTED POWDER, FOR SOLUTION ORAL
Status: ACTIVE | COMMUNITY

## 2023-02-24 RX ORDER — FUROSEMIDE 20 MG/1
TAKE 1  TO 2 TABLETS DAILY AS NEEDED TABLET ORAL
Qty: 30 | Refills: 6 | Status: ACTIVE | COMMUNITY

## 2023-02-24 RX ORDER — CLINDAMYCIN PHOSPHATE 20 MG/G
CREAM VAGINAL
Qty: 40 | Refills: 0 | Status: ACTIVE | COMMUNITY
Start: 2018-05-22

## 2023-02-24 RX ORDER — LATANOPROST/PF 0.005 %
DROPS OPHTHALMIC (EYE)
Qty: 2 | Refills: 0 | Status: ON HOLD | COMMUNITY
Start: 2018-05-09

## 2023-02-24 RX ORDER — DOXYCYCLINE 100 MG/1
CAPSULE ORAL
Qty: 20 | Refills: 0 | Status: ON HOLD | COMMUNITY
Start: 2014-07-24

## 2023-02-24 RX ORDER — NEOMYCIN AND POLYMYXIN B SULFATES AND DEXAMETHASONE 1; 3.5; 1 MG/G; MG/G; [IU]/G
OINTMENT OPHTHALMIC
Qty: 4 | Refills: 0 | Status: ON HOLD | COMMUNITY
Start: 2018-07-24

## 2023-02-24 RX ORDER — NAPROXEN SODIUM 220 MG/1
1 TABLET WITH FOOD OR MILK AS NEEDED TABLET ORAL
Status: ON HOLD | COMMUNITY

## 2023-02-24 RX ORDER — WHEAT DEXTRIN 3 G/3.5 G
AS DIRECTED POWDER (GRAM) ORAL
Status: ACTIVE | COMMUNITY

## 2023-02-24 RX ORDER — PROMETHAZINE HYDROCHLORIDE 12.5 MG/1
TAKE 1 TABLET EVERY 6 HOURS AS NEEDED FOR NAUSEA TABLET ORAL
Qty: 30 | Refills: 1 | Status: ACTIVE | COMMUNITY
Start: 2016-04-28

## 2023-02-24 RX ORDER — CETIRIZINE HYDROCHLORIDE 10 MG/1
TAKE 1 TABLET DAILY AS NEEDED. DOSE UNKNOWN PER PT TABLET, FILM COATED ORAL
Refills: 0 | Status: ACTIVE | COMMUNITY

## 2023-02-24 RX ORDER — HYDROCHLOROTHIAZIDE 25 MG/1
TAKE 1 TABLET DAILY TABLET ORAL
Refills: 0 | Status: ACTIVE | COMMUNITY

## 2023-02-24 RX ORDER — CLOTRIMAZOLE AND BETAMETHASONE DIPROPIONATE 10; .5 MG/G; MG/G
CREAM TOPICAL
Qty: 45 | Refills: 0 | Status: ACTIVE | COMMUNITY
Start: 2011-12-02

## 2023-02-24 RX ORDER — DOCUSATE SODIUM 100 MG/1
TAKE 1 CAPSULE TWICE DAILY AS NEEDED CAPSULE ORAL
Qty: 60 | Refills: 11 | Status: ACTIVE | COMMUNITY

## 2023-02-24 RX ORDER — MOXIFLOXACIN HYDROCHLORIDE TABLETS, 400 MG 400 MG/1
TK 1 T PO QD TABLET, FILM COATED ORAL
Qty: 14 | Refills: 0 | Status: ON HOLD | COMMUNITY
Start: 2018-03-24

## 2023-02-24 RX ORDER — CARVEDILOL 3.12 MG/1
TAKE 1 TABLET TWICE DAILY WITH MEALS TABLET, FILM COATED ORAL
Refills: 0 | Status: ACTIVE | COMMUNITY

## 2023-02-24 RX ORDER — METFORMIN HYDROCHLORIDE 500 MG/1
TAKE 1 TABLET TWICE DAILY WITH FOOD TABLET, COATED ORAL
Refills: 0 | Status: ACTIVE | COMMUNITY

## 2023-02-24 RX ORDER — FLUCONAZOLE 150 MG/1
TAKE 1 TABLET NOW AND AGAIN IN 3 DAYS AS NEEDED TABLET ORAL
Refills: 0 | Status: ACTIVE | COMMUNITY

## 2023-02-24 RX ORDER — LATANOPROST 50 UG/ML
1 DROP INTO AFFECTED EYE IN THE EVENING SOLUTION/ DROPS OPHTHALMIC ONCE A DAY
Status: ACTIVE | COMMUNITY

## 2023-02-24 RX ORDER — FLUOXETINE HYDROCHLORIDE 10 MG/1
1 TABLET TABLET ORAL ONCE A DAY
Status: ACTIVE | COMMUNITY

## 2023-02-24 RX ORDER — DICYCLOMINE HYDROCHLORIDE 10 MG/1
TAKE 1 CAPSULE EVERY 6 HOURS PRN ABDOMINAL PAIN CAPSULE ORAL
Qty: 60 | Refills: 3 | Status: ACTIVE | COMMUNITY
Start: 2020-07-07

## 2023-02-24 RX ORDER — POLYETHYLENE GLYCOL 3350, SODIUM CHLORIDE, SODIUM BICARBONATE, POTASSIUM CHLORIDE 420; 11.2; 5.72; 1.48 G/4L; G/4L; G/4L; G/4L
AS DIRECTED POWDER, FOR SOLUTION ORAL ONCE
Qty: 420 GM | Refills: 0 | OUTPATIENT
Start: 2023-02-24 | End: 2023-02-25

## 2023-02-24 RX ORDER — FLUCONAZOLE 150 MG/1
TABLET ORAL
Qty: 4 | Refills: 0 | Status: ON HOLD | COMMUNITY
Start: 2014-06-25

## 2023-02-24 RX ORDER — TRAMADOL HYDROCHLORIDE 50 MG/1
TAKE 1 TABLET EVERY 6 HOURS AS NEEDED FOR PAIN TABLET ORAL
Refills: 0 | Status: ON HOLD | COMMUNITY

## 2023-02-24 RX ORDER — TOBRAMYCIN AND DEXAMETHASONE 3; 1 MG/ML; MG/ML
SUSPENSION/ DROPS OPHTHALMIC
Qty: 10 | Refills: 0 | Status: ON HOLD | COMMUNITY
Start: 2018-07-12

## 2023-02-24 RX ORDER — PRASTERONE 6.5 MG/1
_INSERT 1 SUPP BEDTIME INSERT VAGINAL
Refills: 0 | Status: ACTIVE | COMMUNITY

## 2023-02-24 RX ORDER — OMEPRAZOLE 40 MG/1
TAKE 1 CAPSULE BY MOUTH EVERY DAY 30 MINUTES BEFORE MORNING MEAL FOR 30 DAYS CAPSULE, DELAYED RELEASE ORAL
Qty: 30 CAPSULE | Refills: 6 | Status: ACTIVE | COMMUNITY

## 2023-02-24 RX ORDER — ALPRAZOLAM 0.25 MG/1
1 TABLET TABLET ORAL
Refills: 0 | Status: ON HOLD | COMMUNITY
Start: 2011-09-27

## 2023-02-24 RX ORDER — LINEZOLID 600 MG/1
TABLET ORAL
Qty: 28 | Refills: 0 | Status: ON HOLD | COMMUNITY
Start: 2018-04-03

## 2023-02-24 RX ORDER — METRONIDAZOLE 500 MG/1
TABLET ORAL
Qty: 21 | Refills: 0 | Status: ON HOLD | COMMUNITY
Start: 2012-05-21

## 2023-02-24 NOTE — HPI-TODAY'S VISIT:
58-year-old with a history of GERD, nausea and vomiting, adenomatous colon polyps due for surveillance in 2023, chronic abdominal pain and constipation associated with functional bowel Disorder. Patient has had some lower abdominal pains recently.  In addition to be having some epigastric discomfort.  She has been bad about her diet however.  Her reflux symptoms haven't worsened the last 2 weeks which she has had a lot of increased stress recently.  There is nausea but not as bad as previously.  There is no vomiting.  She denies any dysphagia.  She does not use her proton pump inhibitor every day.  She has about one to 2 bowel movements per day occasionally she sees some blood on the toilet paper.  She denies straining with her bowel movements. Blood workup 2/4/22 revealed a hemoglobin 11.1 WBC is 7.3 and platelet count of 200,000.  Sodium is 138 potassium 4.3 BUN 17 creatinine 0.83.  AST 14 ALT 20 alkaline phosphatase is 58 and bili 0.2.  TSH is 2.39.  Hemoglobin A1c is 8.8.

## 2023-04-14 ENCOUNTER — OFFICE VISIT (OUTPATIENT)
Dept: URBAN - METROPOLITAN AREA CLINIC 113 | Facility: CLINIC | Age: 58
End: 2023-04-14
Payer: COMMERCIAL

## 2023-04-14 ENCOUNTER — LAB OUTSIDE AN ENCOUNTER (OUTPATIENT)
Dept: URBAN - METROPOLITAN AREA CLINIC 113 | Facility: CLINIC | Age: 58
End: 2023-04-14

## 2023-04-14 VITALS
TEMPERATURE: 96.9 F | BODY MASS INDEX: 48.82 KG/M2 | SYSTOLIC BLOOD PRESSURE: 154 MMHG | RESPIRATION RATE: 14 BRPM | WEIGHT: 293 LBS | HEIGHT: 65 IN | DIASTOLIC BLOOD PRESSURE: 87 MMHG | HEART RATE: 71 BPM

## 2023-04-14 DIAGNOSIS — Z86.010 HISTORY OF ADENOMATOUS POLYP OF COLON: ICD-10-CM

## 2023-04-14 DIAGNOSIS — R10.13 EPIGASTRIC PAIN: ICD-10-CM

## 2023-04-14 DIAGNOSIS — K21.9 GERD WITHOUT ESOPHAGITIS: ICD-10-CM

## 2023-04-14 DIAGNOSIS — R14.0 BLOATING: ICD-10-CM

## 2023-04-14 DIAGNOSIS — K58.1 IRRITABLE BOWEL SYNDROME WITH CONSTIPATION: ICD-10-CM

## 2023-04-14 DIAGNOSIS — K59.01 SLOW TRANSIT CONSTIPATION: ICD-10-CM

## 2023-04-14 PROCEDURE — 99213 OFFICE O/P EST LOW 20 MIN: CPT | Performed by: INTERNAL MEDICINE

## 2023-04-14 RX ORDER — ALPRAZOLAM 0.25 MG/1
1 TABLET TABLET ORAL
Refills: 0 | Status: ON HOLD | COMMUNITY
Start: 2011-09-27

## 2023-04-14 RX ORDER — LATANOPROST/PF 0.005 %
DROPS OPHTHALMIC (EYE)
Qty: 2 | Refills: 0 | Status: ON HOLD | COMMUNITY
Start: 2018-05-09

## 2023-04-14 RX ORDER — MOXIFLOXACIN HYDROCHLORIDE TABLETS, 400 MG 400 MG/1
TK 1 T PO QD TABLET, FILM COATED ORAL
Qty: 14 | Refills: 0 | Status: ON HOLD | COMMUNITY
Start: 2018-03-24

## 2023-04-14 RX ORDER — DICYCLOMINE HYDROCHLORIDE 10 MG/1
TAKE 1 CAPSULE EVERY 6 HOURS PRN ABDOMINAL PAIN CAPSULE ORAL
Qty: 60 | Refills: 3 | Status: ACTIVE | COMMUNITY
Start: 2020-07-07

## 2023-04-14 RX ORDER — HYDROCHLOROTHIAZIDE 25 MG/1
TAKE 1 TABLET DAILY TABLET ORAL
Refills: 0 | Status: ACTIVE | COMMUNITY

## 2023-04-14 RX ORDER — CARVEDILOL 3.12 MG/1
TAKE 1 TABLET TWICE DAILY WITH MEALS TABLET, FILM COATED ORAL
Refills: 0 | Status: ACTIVE | COMMUNITY

## 2023-04-14 RX ORDER — LATANOPROST 50 UG/ML
1 DROP INTO AFFECTED EYE IN THE EVENING SOLUTION/ DROPS OPHTHALMIC ONCE A DAY
Status: ACTIVE | COMMUNITY

## 2023-04-14 RX ORDER — NEOMYCIN AND POLYMYXIN B SULFATES AND DEXAMETHASONE 1; 3.5; 1 MG/G; MG/G; [IU]/G
OINTMENT OPHTHALMIC
Qty: 4 | Refills: 0 | Status: ON HOLD | COMMUNITY
Start: 2018-07-24

## 2023-04-14 RX ORDER — POLYETHYLENE GLYCOL 3350 17 G/17G
AS DIRECTED POWDER, FOR SOLUTION ORAL
Status: ACTIVE | COMMUNITY

## 2023-04-14 RX ORDER — PROMETHAZINE HYDROCHLORIDE 12.5 MG/1
TAKE 1 TABLET EVERY 6 HOURS AS NEEDED FOR NAUSEA TABLET ORAL
Qty: 30 | Refills: 1 | Status: ACTIVE | COMMUNITY
Start: 2016-04-28

## 2023-04-14 RX ORDER — CETIRIZINE HYDROCHLORIDE 10 MG/1
TAKE 1 TABLET DAILY AS NEEDED. DOSE UNKNOWN PER PT TABLET, FILM COATED ORAL
Refills: 0 | Status: ACTIVE | COMMUNITY

## 2023-04-14 RX ORDER — EMPAGLIFLOZIN 25 MG/1
1 TABLET TABLET, FILM COATED ORAL ONCE A DAY
Status: ACTIVE | COMMUNITY

## 2023-04-14 RX ORDER — WHEAT DEXTRIN 3 G/3.5 G
AS DIRECTED POWDER (GRAM) ORAL
Status: ACTIVE | COMMUNITY

## 2023-04-14 RX ORDER — CLOTRIMAZOLE AND BETAMETHASONE DIPROPIONATE 10; .5 MG/G; MG/G
CREAM TOPICAL
Qty: 45 | Refills: 0 | Status: ACTIVE | COMMUNITY
Start: 2011-12-02

## 2023-04-14 RX ORDER — TRAMADOL HYDROCHLORIDE 50 MG/1
TAKE 1 TABLET EVERY 6 HOURS AS NEEDED FOR PAIN TABLET ORAL
Refills: 0 | Status: ON HOLD | COMMUNITY

## 2023-04-14 RX ORDER — FLUCONAZOLE 150 MG/1
TABLET ORAL
Qty: 4 | Refills: 0 | Status: ON HOLD | COMMUNITY
Start: 2014-06-25

## 2023-04-14 RX ORDER — DOCUSATE SODIUM 100 MG/1
TAKE 1 CAPSULE TWICE DAILY AS NEEDED CAPSULE ORAL
Qty: 60 | Refills: 11 | Status: ACTIVE | COMMUNITY

## 2023-04-14 RX ORDER — NAPROXEN SODIUM 220 MG/1
1 TABLET WITH FOOD OR MILK AS NEEDED TABLET ORAL
Status: ON HOLD | COMMUNITY

## 2023-04-14 RX ORDER — CLINDAMYCIN PHOSPHATE 20 MG/G
CREAM VAGINAL
Qty: 40 | Refills: 0 | Status: ACTIVE | COMMUNITY
Start: 2018-05-22

## 2023-04-14 RX ORDER — OMEPRAZOLE 40 MG/1
TAKE 1 CAPSULE BY MOUTH EVERY DAY 30 MINUTES BEFORE MORNING MEAL FOR 30 DAYS CAPSULE, DELAYED RELEASE ORAL
Qty: 30 CAPSULE | Refills: 6 | Status: ACTIVE | COMMUNITY

## 2023-04-14 RX ORDER — FLUOXETINE HYDROCHLORIDE 10 MG/1
1 TABLET TABLET ORAL ONCE A DAY
Status: ACTIVE | COMMUNITY

## 2023-04-14 RX ORDER — FLUCONAZOLE 150 MG/1
TAKE 1 TABLET NOW AND AGAIN IN 3 DAYS AS NEEDED TABLET ORAL
Refills: 0 | Status: ACTIVE | COMMUNITY

## 2023-04-14 RX ORDER — LINEZOLID 600 MG/1
TABLET ORAL
Qty: 28 | Refills: 0 | Status: ON HOLD | COMMUNITY
Start: 2018-04-03

## 2023-04-14 RX ORDER — DOXYCYCLINE 100 MG/1
CAPSULE ORAL
Qty: 20 | Refills: 0 | Status: ON HOLD | COMMUNITY
Start: 2014-07-24

## 2023-04-14 RX ORDER — PRASTERONE 6.5 MG/1
_INSERT 1 SUPP BEDTIME INSERT VAGINAL
Refills: 0 | Status: ACTIVE | COMMUNITY

## 2023-04-14 RX ORDER — METRONIDAZOLE 500 MG/1
TABLET ORAL
Qty: 21 | Refills: 0 | Status: ON HOLD | COMMUNITY
Start: 2012-05-21

## 2023-04-14 RX ORDER — TOBRAMYCIN AND DEXAMETHASONE 3; 1 MG/ML; MG/ML
SUSPENSION/ DROPS OPHTHALMIC
Qty: 10 | Refills: 0 | Status: ON HOLD | COMMUNITY
Start: 2018-07-12

## 2023-04-14 RX ORDER — FUROSEMIDE 20 MG/1
TAKE 1  TO 2 TABLETS DAILY AS NEEDED TABLET ORAL
Qty: 30 | Refills: 6 | Status: ACTIVE | COMMUNITY

## 2023-04-14 RX ORDER — METFORMIN HYDROCHLORIDE 500 MG/1
TAKE 1 TABLET TWICE DAILY WITH FOOD TABLET, COATED ORAL
Refills: 0 | Status: ACTIVE | COMMUNITY

## 2023-04-14 NOTE — HPI-TODAY'S VISIT:
58-year-old with a history of GERD, nausea and vomiting, adenomatous colon polyps due for surveillance in 2023, chronic abdominal pain and constipation associated with functional bowel Disorder. She is better on the omeprazole that she takes each day.  She occasionally gags if she eats chocolates or has barbecue.  She denies any dysphagia.  She has had some heartburn.  She's had some upper abdominal pains intermittently as well.  The pains are better after a bowel movement.  She is having a bowel movement each day but is constipated.  She has not been using her MiraLAX.  She was having some nausea but that is currently better.  Her hemoglobin A1c was 11 recently.  She's had no bright red blood per rectum or any melena. Blood workup 2/4/22 revealed a hemoglobin 11.1 WBC is 7.3 and platelet count of 200,000.  Sodium is 138 potassium 4.3 BUN 17 creatinine 0.83.  AST 14 ALT 20 alkaline phosphatase is 58 and bili 0.2.  TSH is 2.39.  Hemoglobin A1c is 8.8.

## 2023-04-14 NOTE — HPI-OTHER HISTORIES
Abdominal ultrasound on 3/22/2023 revealed an echogenic liver consistent with possible fatty infiltration, gallbladder was normal with no gallstones.  Common bile duct was 4 mm in size.  Colonoscopy on 12/31/2018 revealed a moderate redundant colon, an adult colonoscope was used.  She had pancolonic diverticuli, no colon polyps.  EGD on 12/17/2018 revealed a normal Z-line and esophagus at 40 cm a small hiatal hernia.  Mild erythema of the gastric antrum and body.  Normal duodenum.  Stomach biopsies revealed chemical gastropathy negative for H. pylori.  Duodenal biopsies were normal.  Esophageal biopsies revealed reflux changes with no eosinophilic esophagitis.

## 2023-05-18 ENCOUNTER — CLAIMS CREATED FROM THE CLAIM WINDOW (OUTPATIENT)
Dept: URBAN - METROPOLITAN AREA MEDICAL CENTER 19 | Facility: MEDICAL CENTER | Age: 58
End: 2023-05-18

## 2023-05-18 ENCOUNTER — CLAIMS CREATED FROM THE CLAIM WINDOW (OUTPATIENT)
Dept: URBAN - METROPOLITAN AREA MEDICAL CENTER 19 | Facility: MEDICAL CENTER | Age: 58
End: 2023-05-18
Payer: COMMERCIAL

## 2023-05-18 DIAGNOSIS — K31.89 OTHER DISEASES OF STOMACH AND DUODENUM: ICD-10-CM

## 2023-05-18 DIAGNOSIS — K21.9 GERD WITHOUT ESOPHAGITIS: ICD-10-CM

## 2023-05-18 DIAGNOSIS — R10.13 EPIGASTRIC PAIN: ICD-10-CM

## 2023-05-18 PROCEDURE — 43239 EGD BIOPSY SINGLE/MULTIPLE: CPT | Performed by: INTERNAL MEDICINE

## 2023-06-16 ENCOUNTER — LAB OUTSIDE AN ENCOUNTER (OUTPATIENT)
Dept: URBAN - METROPOLITAN AREA CLINIC 113 | Facility: CLINIC | Age: 58
End: 2023-06-16

## 2023-06-16 ENCOUNTER — DASHBOARD ENCOUNTERS (OUTPATIENT)
Age: 58
End: 2023-06-16

## 2023-06-16 ENCOUNTER — OFFICE VISIT (OUTPATIENT)
Dept: URBAN - METROPOLITAN AREA CLINIC 113 | Facility: CLINIC | Age: 58
End: 2023-06-16
Payer: COMMERCIAL

## 2023-06-16 VITALS
SYSTOLIC BLOOD PRESSURE: 177 MMHG | DIASTOLIC BLOOD PRESSURE: 92 MMHG | WEIGHT: 293 LBS | RESPIRATION RATE: 20 BRPM | HEART RATE: 69 BPM | TEMPERATURE: 97.1 F | BODY MASS INDEX: 48.82 KG/M2 | HEIGHT: 65 IN

## 2023-06-16 DIAGNOSIS — K21.9 GERD WITHOUT ESOPHAGITIS: ICD-10-CM

## 2023-06-16 DIAGNOSIS — K58.1 IRRITABLE BOWEL SYNDROME WITH CONSTIPATION: ICD-10-CM

## 2023-06-16 DIAGNOSIS — R14.0 BLOATING: ICD-10-CM

## 2023-06-16 DIAGNOSIS — R10.13 EPIGASTRIC PAIN: ICD-10-CM

## 2023-06-16 DIAGNOSIS — K59.01 SLOW TRANSIT CONSTIPATION: ICD-10-CM

## 2023-06-16 DIAGNOSIS — Z86.010 HISTORY OF ADENOMATOUS POLYP OF COLON: ICD-10-CM

## 2023-06-16 PROCEDURE — 99213 OFFICE O/P EST LOW 20 MIN: CPT | Performed by: INTERNAL MEDICINE

## 2023-06-16 RX ORDER — TOBRAMYCIN AND DEXAMETHASONE 3; 1 MG/ML; MG/ML
SUSPENSION/ DROPS OPHTHALMIC
Qty: 10 | Refills: 0 | Status: ON HOLD | COMMUNITY
Start: 2018-07-12

## 2023-06-16 RX ORDER — METRONIDAZOLE 500 MG/1
TABLET ORAL
Qty: 21 | Refills: 0 | Status: ON HOLD | COMMUNITY
Start: 2012-05-21

## 2023-06-16 RX ORDER — PRASTERONE 6.5 MG/1
_INSERT 1 SUPP BEDTIME INSERT VAGINAL
Refills: 0 | Status: ACTIVE | COMMUNITY

## 2023-06-16 RX ORDER — DOCUSATE SODIUM 100 MG/1
TAKE 1 CAPSULE TWICE DAILY AS NEEDED CAPSULE ORAL
Qty: 60 | Refills: 11 | Status: ACTIVE | COMMUNITY

## 2023-06-16 RX ORDER — HYDROCHLOROTHIAZIDE 25 MG/1
TAKE 1 TABLET DAILY TABLET ORAL
Refills: 0 | Status: ACTIVE | COMMUNITY

## 2023-06-16 RX ORDER — PROMETHAZINE HYDROCHLORIDE 12.5 MG/1
TAKE 1 TABLET EVERY 6 HOURS AS NEEDED FOR NAUSEA TABLET ORAL
Qty: 30 | Refills: 1 | Status: ACTIVE | COMMUNITY
Start: 2016-04-28

## 2023-06-16 RX ORDER — MOXIFLOXACIN HYDROCHLORIDE TABLETS, 400 MG 400 MG/1
TK 1 T PO QD TABLET, FILM COATED ORAL
Qty: 14 | Refills: 0 | Status: ON HOLD | COMMUNITY
Start: 2018-03-24

## 2023-06-16 RX ORDER — CLOTRIMAZOLE AND BETAMETHASONE DIPROPIONATE 10; .5 MG/G; MG/G
CREAM TOPICAL
Qty: 45 | Refills: 0 | Status: ACTIVE | COMMUNITY
Start: 2011-12-02

## 2023-06-16 RX ORDER — CETIRIZINE HYDROCHLORIDE 10 MG/1
TAKE 1 TABLET DAILY AS NEEDED. DOSE UNKNOWN PER PT TABLET, FILM COATED ORAL
Refills: 0 | Status: ACTIVE | COMMUNITY

## 2023-06-16 RX ORDER — LATANOPROST/PF 0.005 %
DROPS OPHTHALMIC (EYE)
Qty: 2 | Refills: 0 | Status: ON HOLD | COMMUNITY
Start: 2018-05-09

## 2023-06-16 RX ORDER — FLUCONAZOLE 150 MG/1
TAKE 1 TABLET NOW AND AGAIN IN 3 DAYS AS NEEDED TABLET ORAL
Refills: 0 | Status: ACTIVE | COMMUNITY

## 2023-06-16 RX ORDER — FLUOXETINE HYDROCHLORIDE 10 MG/1
1 TABLET TABLET ORAL ONCE A DAY
Status: ACTIVE | COMMUNITY

## 2023-06-16 RX ORDER — DOXYCYCLINE 100 MG/1
CAPSULE ORAL
Qty: 20 | Refills: 0 | Status: ON HOLD | COMMUNITY
Start: 2014-07-24

## 2023-06-16 RX ORDER — METFORMIN HYDROCHLORIDE 500 MG/1
TAKE 1 TABLET TWICE DAILY WITH FOOD TABLET, COATED ORAL
Refills: 0 | Status: ACTIVE | COMMUNITY

## 2023-06-16 RX ORDER — LATANOPROST 50 UG/ML
1 DROP INTO AFFECTED EYE IN THE EVENING SOLUTION/ DROPS OPHTHALMIC ONCE A DAY
Status: ACTIVE | COMMUNITY

## 2023-06-16 RX ORDER — WHEAT DEXTRIN 3 G/3.5 G
AS DIRECTED POWDER (GRAM) ORAL
Status: ACTIVE | COMMUNITY

## 2023-06-16 RX ORDER — LINEZOLID 600 MG/1
TABLET ORAL
Qty: 28 | Refills: 0 | Status: ON HOLD | COMMUNITY
Start: 2018-04-03

## 2023-06-16 RX ORDER — OMEPRAZOLE 40 MG/1
TAKE 1 CAPSULE BY MOUTH EVERY DAY 30 MINUTES BEFORE MORNING MEAL FOR 30 DAYS CAPSULE, DELAYED RELEASE ORAL
Qty: 30 CAPSULE | Refills: 6 | Status: ACTIVE | COMMUNITY

## 2023-06-16 RX ORDER — POLYETHYLENE GLYCOL 3350 17 G/17G
AS DIRECTED POWDER, FOR SOLUTION ORAL
Status: ACTIVE | COMMUNITY

## 2023-06-16 RX ORDER — ALPRAZOLAM 0.25 MG/1
1 TABLET TABLET ORAL
Refills: 0 | Status: ON HOLD | COMMUNITY
Start: 2011-09-27

## 2023-06-16 RX ORDER — DICYCLOMINE HYDROCHLORIDE 10 MG/1
TAKE 1 CAPSULE EVERY 6 HOURS PRN ABDOMINAL PAIN CAPSULE ORAL
Qty: 60 | Refills: 3 | Status: ACTIVE | COMMUNITY
Start: 2020-07-07

## 2023-06-16 RX ORDER — FLUCONAZOLE 150 MG/1
TABLET ORAL
Qty: 4 | Refills: 0 | Status: ON HOLD | COMMUNITY
Start: 2014-06-25

## 2023-06-16 RX ORDER — NAPROXEN SODIUM 220 MG/1
1 TABLET WITH FOOD OR MILK AS NEEDED TABLET ORAL
Status: ON HOLD | COMMUNITY

## 2023-06-16 RX ORDER — TRAMADOL HYDROCHLORIDE 50 MG/1
TAKE 1 TABLET EVERY 6 HOURS AS NEEDED FOR PAIN TABLET ORAL
Refills: 0 | Status: ON HOLD | COMMUNITY

## 2023-06-16 RX ORDER — NEOMYCIN AND POLYMYXIN B SULFATES AND DEXAMETHASONE 1; 3.5; 1 MG/G; MG/G; [IU]/G
OINTMENT OPHTHALMIC
Qty: 4 | Refills: 0 | Status: ON HOLD | COMMUNITY
Start: 2018-07-24

## 2023-06-16 RX ORDER — CARVEDILOL 3.12 MG/1
TAKE 1 TABLET TWICE DAILY WITH MEALS TABLET, FILM COATED ORAL
Refills: 0 | Status: ACTIVE | COMMUNITY

## 2023-06-16 RX ORDER — EMPAGLIFLOZIN 25 MG/1
1 TABLET TABLET, FILM COATED ORAL ONCE A DAY
Status: ACTIVE | COMMUNITY

## 2023-06-16 RX ORDER — FUROSEMIDE 20 MG/1
TAKE 1  TO 2 TABLETS DAILY AS NEEDED TABLET ORAL
Qty: 30 | Refills: 6 | Status: ACTIVE | COMMUNITY

## 2023-06-16 RX ORDER — CLINDAMYCIN PHOSPHATE 20 MG/G
CREAM VAGINAL
Qty: 40 | Refills: 0 | Status: ACTIVE | COMMUNITY
Start: 2018-05-22

## 2023-06-16 NOTE — HPI-TODAY'S VISIT:
58-year-old with a history of GERD, nausea and vomiting, adenomatous colon polyps due for surveillance in 2023, chronic abdominal pain and constipation associated with functional bowel Disorder.  She is much better with her heartburn symptoms.  She is taking her medications as prescribed.  This no dysphagia.  Her abdominal pain in the epigastric region is improved as well.  She does have nausea but no further vomiting.  She is moving her bowels about once or twice per day.  Has been no blood or melena. Her hemoglobin A1c was 11 recently.  Blood work on 3/18/2023 revealed a hemoglobin 12.4, WBC 6.6 and platelet count of 199,000.  Sodium 137 potassium 4 BUN 15 creatinine 0.85.  Glucose is 271.  AST 30, ALT 31, alkaline phosphatase 68, total bili 0.7.  Lipase is 114.  T4 is 10.4.  A1c is 11.

## 2023-06-16 NOTE — HPI-OTHER HISTORIES
EGD on 5/18/2023 revealed a normal esophagus and Z-line at 40 cm with a wide open hiatus.  There was some erythema of the gastric antrum.  Biopsies of gastric antrum revealed no significant abnormality.  There is no H. pylori.  The duodenum was normal  Abdominal ultrasound on 3/22/2023 revealed an echogenic liver consistent with possible fatty infiltration, gallbladder was normal with no gallstones.  Common bile duct was 4 mm in size.  Colonoscopy on 12/31/2018 revealed a moderate redundant colon, an adult colonoscope was used.  She had pancolonic diverticuli, no colon polyps.  EGD on 12/17/2018 revealed a normal Z-line and esophagus at 40 cm a small hiatal hernia.  Mild erythema of the gastric antrum and body.  Normal duodenum.  Stomach biopsies revealed chemical gastropathy negative for H. pylori.  Duodenal biopsies were normal.  Esophageal biopsies revealed reflux changes with no eosinophilic esophagitis.

## 2023-07-14 ENCOUNTER — TELEPHONE ENCOUNTER (OUTPATIENT)
Dept: URBAN - METROPOLITAN AREA CLINIC 113 | Facility: CLINIC | Age: 58
End: 2023-07-14

## 2023-07-21 ENCOUNTER — OFFICE VISIT (OUTPATIENT)
Dept: URBAN - METROPOLITAN AREA CLINIC 113 | Facility: CLINIC | Age: 58
End: 2023-07-21

## 2023-09-22 ENCOUNTER — OFFICE VISIT (OUTPATIENT)
Dept: URBAN - METROPOLITAN AREA CLINIC 113 | Facility: CLINIC | Age: 58
End: 2023-09-22

## 2024-09-27 ENCOUNTER — OFFICE VISIT (OUTPATIENT)
Dept: URBAN - METROPOLITAN AREA CLINIC 113 | Facility: CLINIC | Age: 59
End: 2024-09-27

## 2024-10-03 ENCOUNTER — OFFICE VISIT (OUTPATIENT)
Dept: URBAN - METROPOLITAN AREA CLINIC 113 | Facility: CLINIC | Age: 59
End: 2024-10-03

## 2024-11-11 PROBLEM — 429047008: Status: ACTIVE | Noted: 2024-11-11

## 2024-11-12 ENCOUNTER — OFFICE VISIT (OUTPATIENT)
Dept: URBAN - METROPOLITAN AREA CLINIC 113 | Facility: CLINIC | Age: 59
End: 2024-11-12
Payer: COMMERCIAL

## 2024-11-12 VITALS
TEMPERATURE: 98.2 F | SYSTOLIC BLOOD PRESSURE: 198 MMHG | BODY MASS INDEX: 48.82 KG/M2 | HEART RATE: 94 BPM | DIASTOLIC BLOOD PRESSURE: 93 MMHG | HEIGHT: 65 IN | RESPIRATION RATE: 18 BRPM | WEIGHT: 293 LBS

## 2024-11-12 DIAGNOSIS — K58.1 IRRITABLE BOWEL SYNDROME WITH CONSTIPATION: ICD-10-CM

## 2024-11-12 DIAGNOSIS — K21.9 GERD WITHOUT ESOPHAGITIS: ICD-10-CM

## 2024-11-12 DIAGNOSIS — R14.0 BLOATING: ICD-10-CM

## 2024-11-12 DIAGNOSIS — Z86.0101 HISTORY OF ADENOMATOUS POLYP OF COLON: ICD-10-CM

## 2024-11-12 PROCEDURE — 99212 OFFICE O/P EST SF 10 MIN: CPT | Performed by: INTERNAL MEDICINE

## 2024-11-12 RX ORDER — CARVEDILOL 3.12 MG/1
TAKE 1 TABLET TWICE DAILY WITH MEALS TABLET, FILM COATED ORAL
Refills: 0 | Status: ACTIVE | COMMUNITY

## 2024-11-12 RX ORDER — PRASTERONE 6.5 MG/1
_INSERT 1 SUPP BEDTIME INSERT VAGINAL
Refills: 0 | Status: ACTIVE | COMMUNITY

## 2024-11-12 RX ORDER — EMPAGLIFLOZIN 25 MG/1
1 TABLET TABLET, FILM COATED ORAL ONCE A DAY
Status: ACTIVE | COMMUNITY

## 2024-11-12 RX ORDER — LATANOPROST 50 UG/ML
1 DROP INTO AFFECTED EYE IN THE EVENING SOLUTION/ DROPS OPHTHALMIC ONCE A DAY
Status: ACTIVE | COMMUNITY

## 2024-11-12 RX ORDER — METFORMIN HYDROCHLORIDE 500 MG/1
TAKE 1 TABLET TWICE DAILY WITH FOOD TABLET, COATED ORAL
Refills: 0 | Status: ACTIVE | COMMUNITY

## 2024-11-12 RX ORDER — PROMETHAZINE HYDROCHLORIDE 12.5 MG/1
TAKE 1 TABLET EVERY 6 HOURS AS NEEDED FOR NAUSEA TABLET ORAL
Qty: 30 | Refills: 1 | Status: ACTIVE | COMMUNITY
Start: 2016-04-28

## 2024-11-12 RX ORDER — CETIRIZINE HYDROCHLORIDE 10 MG/1
TAKE 1 TABLET DAILY AS NEEDED. DOSE UNKNOWN PER PT TABLET, FILM COATED ORAL
Refills: 0 | Status: ACTIVE | COMMUNITY

## 2024-11-12 RX ORDER — CLINDAMYCIN PHOSPHATE 20 MG/G
CREAM VAGINAL
Qty: 40 | Refills: 0 | Status: ACTIVE | COMMUNITY
Start: 2018-05-22

## 2024-11-12 RX ORDER — LINEZOLID 600 MG/1
TABLET ORAL
Qty: 28 | Refills: 0 | Status: ON HOLD | COMMUNITY
Start: 2018-04-03

## 2024-11-12 RX ORDER — ALPRAZOLAM 0.25 MG/1
1 TABLET TABLET ORAL
Refills: 0 | Status: ON HOLD | COMMUNITY
Start: 2011-09-27

## 2024-11-12 RX ORDER — TRAMADOL HYDROCHLORIDE 50 MG/1
TAKE 1 TABLET EVERY 6 HOURS AS NEEDED FOR PAIN TABLET, FILM COATED ORAL
Refills: 0 | Status: ON HOLD | COMMUNITY

## 2024-11-12 RX ORDER — FUROSEMIDE 20 MG/1
TAKE 1  TO 2 TABLETS DAILY AS NEEDED TABLET ORAL
Qty: 30 | Refills: 6 | Status: ACTIVE | COMMUNITY

## 2024-11-12 RX ORDER — NAPROXEN SODIUM 220 MG/1
1 TABLET WITH FOOD OR MILK AS NEEDED TABLET ORAL
Status: ON HOLD | COMMUNITY

## 2024-11-12 RX ORDER — MOXIFLOXACIN HYDROCHLORIDE TABLETS, 400 MG 400 MG/1
TK 1 T PO QD TABLET, FILM COATED ORAL
Qty: 14 | Refills: 0 | Status: ON HOLD | COMMUNITY
Start: 2018-03-24

## 2024-11-12 RX ORDER — HYDROCHLOROTHIAZIDE 25 MG/1
TAKE 1 TABLET DAILY TABLET ORAL
Refills: 0 | Status: ACTIVE | COMMUNITY

## 2024-11-12 RX ORDER — METRONIDAZOLE 500 MG/1
TABLET ORAL
Qty: 21 | Refills: 0 | Status: ON HOLD | COMMUNITY
Start: 2012-05-21

## 2024-11-12 RX ORDER — OMEPRAZOLE 40 MG/1
TAKE 1 CAPSULE BY MOUTH EVERY DAY 30 MINUTES BEFORE MORNING MEAL FOR 30 DAYS CAPSULE, DELAYED RELEASE ORAL
Qty: 30 CAPSULE | Refills: 6 | Status: ACTIVE | COMMUNITY

## 2024-11-12 RX ORDER — TOBRAMYCIN AND DEXAMETHASONE 3; 1 MG/ML; MG/ML
SUSPENSION/ DROPS OPHTHALMIC
Qty: 10 | Refills: 0 | Status: ON HOLD | COMMUNITY
Start: 2018-07-12

## 2024-11-12 RX ORDER — POLYETHYLENE GLYCOL 3350 17 G/17G
AS DIRECTED POWDER, FOR SOLUTION ORAL
Status: ACTIVE | COMMUNITY

## 2024-11-12 RX ORDER — CLOTRIMAZOLE AND BETAMETHASONE DIPROPIONATE 10; .5 MG/G; MG/G
CREAM TOPICAL
Qty: 45 | Refills: 0 | Status: ACTIVE | COMMUNITY
Start: 2011-12-02

## 2024-11-12 RX ORDER — DICYCLOMINE HYDROCHLORIDE 10 MG/1
TAKE 1 CAPSULE EVERY 6 HOURS PRN ABDOMINAL PAIN CAPSULE ORAL
Qty: 60 | Refills: 3 | Status: ACTIVE | COMMUNITY
Start: 2020-07-07

## 2024-11-12 RX ORDER — LATANOPROST/PF 0.005 %
DROPS OPHTHALMIC (EYE)
Qty: 2 | Refills: 0 | Status: ON HOLD | COMMUNITY
Start: 2018-05-09

## 2024-11-12 RX ORDER — FLUOXETINE HYDROCHLORIDE 10 MG/1
1 TABLET TABLET ORAL ONCE A DAY
Status: ACTIVE | COMMUNITY

## 2024-11-12 RX ORDER — DOXYCYCLINE 100 MG/1
CAPSULE ORAL
Qty: 20 | Refills: 0 | Status: ON HOLD | COMMUNITY
Start: 2014-07-24

## 2024-11-12 RX ORDER — NEOMYCIN AND POLYMYXIN B SULFATES AND DEXAMETHASONE 1; 3.5; 1 MG/G; MG/G; [IU]/G
OINTMENT OPHTHALMIC
Qty: 4 | Refills: 0 | Status: ON HOLD | COMMUNITY
Start: 2018-07-24

## 2024-11-12 RX ORDER — DOCUSATE SODIUM 100 MG/1
TAKE 1 CAPSULE TWICE DAILY AS NEEDED CAPSULE ORAL
Qty: 60 | Refills: 11 | Status: ACTIVE | COMMUNITY

## 2024-11-12 RX ORDER — FLUCONAZOLE 150 MG/1
TABLET ORAL
Qty: 4 | Refills: 0 | Status: ON HOLD | COMMUNITY
Start: 2014-06-25

## 2024-11-12 RX ORDER — WHEAT DEXTRIN 3 G/3.5 G
AS DIRECTED POWDER (GRAM) ORAL
Status: ACTIVE | COMMUNITY

## 2024-11-12 RX ORDER — FLUCONAZOLE 150 MG/1
TAKE 1 TABLET NOW AND AGAIN IN 3 DAYS AS NEEDED TABLET ORAL
Refills: 0 | Status: ACTIVE | COMMUNITY

## 2024-11-12 NOTE — HPI-TODAY'S VISIT:
58-year-old with a history of GERD, nausea and vomiting, adenomatous colon polyps due for surveillance in 2023, chronic abdominal pain and constipation associated with functional bowel Disorder.  Her heartburn symptoms are much better.  She rarely will get heartburn maybe once every 2 months.  There is no dysphagia or abdominal pain.  She has been trying to lose weight and has lost some weight.  There is no nausea or vomiting.  She still gets gas and bloating.  She is trying to be lactose-free but it is difficult for her.  She continues to use butter.  She has 2 bowel movements per day now there is been no blood or melena.  Blood work on 3/18/2023 revealed a hemoglobin 12.4, WBC 6.6 and platelet count of 199,000.  Sodium 137 potassium 4 BUN 15 creatinine 0.85.  Glucose is 271.  AST 30, ALT 31, alkaline phosphatase 68, total bili 0.7.  Lipase is 114.  T4 is 10.4.  A1c is 11.

## 2025-03-04 ENCOUNTER — OFFICE VISIT (OUTPATIENT)
Dept: URBAN - METROPOLITAN AREA CLINIC 113 | Facility: CLINIC | Age: 60
End: 2025-03-04

## 2025-04-17 ENCOUNTER — OFFICE VISIT (OUTPATIENT)
Dept: URBAN - METROPOLITAN AREA CLINIC 113 | Facility: CLINIC | Age: 60
End: 2025-04-17

## 2025-05-15 ENCOUNTER — OFFICE VISIT (OUTPATIENT)
Dept: URBAN - METROPOLITAN AREA CLINIC 113 | Facility: CLINIC | Age: 60
End: 2025-05-15
Payer: COMMERCIAL

## 2025-05-15 DIAGNOSIS — K59.01 SLOW TRANSIT CONSTIPATION: ICD-10-CM

## 2025-05-15 DIAGNOSIS — Z86.0101 HISTORY OF ADENOMATOUS POLYP OF COLON: ICD-10-CM

## 2025-05-15 DIAGNOSIS — K58.1 IRRITABLE BOWEL SYNDROME WITH CONSTIPATION: ICD-10-CM

## 2025-05-15 DIAGNOSIS — K21.9 GERD WITHOUT ESOPHAGITIS: ICD-10-CM

## 2025-05-15 DIAGNOSIS — R14.0 BLOATING: ICD-10-CM

## 2025-05-15 PROCEDURE — 99213 OFFICE O/P EST LOW 20 MIN: CPT | Performed by: INTERNAL MEDICINE

## 2025-05-15 RX ORDER — CLOTRIMAZOLE AND BETAMETHASONE DIPROPIONATE 10; .5 MG/G; MG/G
CREAM TOPICAL
Qty: 45 | Refills: 0 | Status: ACTIVE | COMMUNITY
Start: 2011-12-02

## 2025-05-15 RX ORDER — METRONIDAZOLE 500 MG/1
TABLET ORAL
Qty: 21 | Refills: 0 | Status: ON HOLD | COMMUNITY
Start: 2012-05-21

## 2025-05-15 RX ORDER — FUROSEMIDE 20 MG/1
TAKE 1  TO 2 TABLETS DAILY AS NEEDED TABLET ORAL
Qty: 30 | Refills: 6 | Status: ACTIVE | COMMUNITY

## 2025-05-15 RX ORDER — DICYCLOMINE HYDROCHLORIDE 10 MG/1
TAKE 1 CAPSULE EVERY 6 HOURS PRN ABDOMINAL PAIN CAPSULE ORAL
Qty: 60 | Refills: 3 | Status: ACTIVE | COMMUNITY
Start: 2020-07-07

## 2025-05-15 RX ORDER — HYDROCHLOROTHIAZIDE 25 MG/1
TAKE 1 TABLET DAILY TABLET ORAL
Refills: 0 | Status: ACTIVE | COMMUNITY

## 2025-05-15 RX ORDER — PROMETHAZINE HYDROCHLORIDE 12.5 MG/1
1 TABLET TABLET ORAL
Qty: 30 | Refills: 0
Start: 2016-04-28

## 2025-05-15 RX ORDER — NAPROXEN SODIUM 220 MG
1 TABLET WITH FOOD OR MILK AS NEEDED CAPSULE ORAL
Status: ON HOLD | COMMUNITY

## 2025-05-15 RX ORDER — DOXYCYCLINE 100 MG/1
CAPSULE ORAL
Qty: 20 | Refills: 0 | Status: ON HOLD | COMMUNITY
Start: 2014-07-24

## 2025-05-15 RX ORDER — TRAMADOL HYDROCHLORIDE 50 MG/1
TAKE 1 TABLET EVERY 6 HOURS AS NEEDED FOR PAIN TABLET, FILM COATED ORAL
Refills: 0 | Status: ON HOLD | COMMUNITY

## 2025-05-15 RX ORDER — ALPRAZOLAM 0.25 MG/1
1 TABLET TABLET ORAL
Refills: 0 | Status: ON HOLD | COMMUNITY
Start: 2011-09-27

## 2025-05-15 RX ORDER — DOCUSATE SODIUM 100 MG/1
TAKE 1 CAPSULE TWICE DAILY AS NEEDED CAPSULE ORAL
Qty: 60 | Refills: 11 | Status: ACTIVE | COMMUNITY

## 2025-05-15 RX ORDER — WHEAT DEXTRIN 3 G/3.5 G
AS DIRECTED POWDER (GRAM) ORAL
Status: ACTIVE | COMMUNITY

## 2025-05-15 RX ORDER — NEOMYCIN AND POLYMYXIN B SULFATES AND DEXAMETHASONE 1; 3.5; 1 MG/G; MG/G; [IU]/G
OINTMENT OPHTHALMIC
Qty: 4 | Refills: 0 | Status: ON HOLD | COMMUNITY
Start: 2018-07-24

## 2025-05-15 RX ORDER — MOXIFLOXACIN HYDROCHLORIDE TABLETS, 400 MG 400 MG/1
TK 1 T PO QD TABLET, FILM COATED ORAL
Qty: 14 | Refills: 0 | Status: ON HOLD | COMMUNITY
Start: 2018-03-24

## 2025-05-15 RX ORDER — PRASTERONE 6.5 MG/1
_INSERT 1 SUPP BEDTIME INSERT VAGINAL
Refills: 0 | Status: ACTIVE | COMMUNITY

## 2025-05-15 RX ORDER — FLUCONAZOLE 150 MG/1
TAKE 1 TABLET NOW AND AGAIN IN 3 DAYS AS NEEDED TABLET ORAL
Refills: 0 | Status: ACTIVE | COMMUNITY

## 2025-05-15 RX ORDER — POLYETHYLENE GLYCOL 3350 17 G/DOSE
AS DIRECTED POWDER (GRAM) ORAL
Status: ACTIVE | COMMUNITY

## 2025-05-15 RX ORDER — EMPAGLIFLOZIN 25 MG/1
1 TABLET TABLET, FILM COATED ORAL ONCE A DAY
Status: ACTIVE | COMMUNITY

## 2025-05-15 RX ORDER — LINEZOLID 600 MG/1
TABLET ORAL
Qty: 28 | Refills: 0 | Status: ON HOLD | COMMUNITY
Start: 2018-04-03

## 2025-05-15 RX ORDER — PROMETHAZINE HYDROCHLORIDE 12.5 MG/1
TAKE 1 TABLET EVERY 6 HOURS AS NEEDED FOR NAUSEA TABLET ORAL
Qty: 30 | Refills: 1 | Status: ACTIVE | COMMUNITY
Start: 2016-04-28

## 2025-05-15 RX ORDER — LATANOPROST/PF 0.005 %
DROPS OPHTHALMIC (EYE)
Qty: 2 | Refills: 0 | Status: ON HOLD | COMMUNITY
Start: 2018-05-09

## 2025-05-15 RX ORDER — TOBRAMYCIN AND DEXAMETHASONE 3; 1 MG/ML; MG/ML
SUSPENSION/ DROPS OPHTHALMIC
Qty: 10 | Refills: 0 | Status: ON HOLD | COMMUNITY
Start: 2018-07-12

## 2025-05-15 RX ORDER — METFORMIN HYDROCHLORIDE 500 MG/1
TAKE 1 TABLET TWICE DAILY WITH FOOD TABLET, COATED ORAL
Refills: 0 | Status: ACTIVE | COMMUNITY

## 2025-05-15 RX ORDER — CETIRIZINE HYDROCHLORIDE 10 MG/1
TAKE 1 TABLET DAILY AS NEEDED. DOSE UNKNOWN PER PT TABLET, FILM COATED ORAL
Refills: 0 | Status: ACTIVE | COMMUNITY

## 2025-05-15 RX ORDER — FLUCONAZOLE 150 MG/1
TABLET ORAL
Qty: 4 | Refills: 0 | Status: ON HOLD | COMMUNITY
Start: 2014-06-25

## 2025-05-15 RX ORDER — LATANOPROST 50 UG/ML
1 DROP INTO AFFECTED EYE IN THE EVENING SOLUTION/ DROPS OPHTHALMIC ONCE A DAY
Status: ACTIVE | COMMUNITY

## 2025-05-15 RX ORDER — CLINDAMYCIN PHOSPHATE 20 MG/G
CREAM VAGINAL
Qty: 40 | Refills: 0 | Status: ACTIVE | COMMUNITY
Start: 2018-05-22

## 2025-05-15 RX ORDER — CARVEDILOL 3.12 MG/1
TAKE 1 TABLET TWICE DAILY WITH MEALS TABLET, FILM COATED ORAL
Refills: 0 | Status: ACTIVE | COMMUNITY

## 2025-05-15 RX ORDER — OMEPRAZOLE 40 MG/1
TAKE 1 CAPSULE BY MOUTH EVERY DAY 30 MINUTES BEFORE MORNING MEAL FOR 30 DAYS CAPSULE, DELAYED RELEASE ORAL
Qty: 30 CAPSULE | Refills: 6 | Status: ACTIVE | COMMUNITY

## 2025-05-15 RX ORDER — FLUOXETINE HYDROCHLORIDE 10 MG/1
1 TABLET TABLET ORAL ONCE A DAY
Status: ACTIVE | COMMUNITY

## 2025-05-15 NOTE — HPI-TODAY'S VISIT:
60-year-old with a history of GERD, nausea and vomiting, adenomatous colon polyps due for surveillance in 2023, chronic abdominal pain and constipation associated with functional bowel disorder.  Overall she is doing fairly well.  She did eat some [employer] yesterday and that bothered her stomach gave her nausea.  There is no vomiting.  She gets heartburn breakthrough maybe once a month or 2.  There is been no dysphagia.  Sometimes she gets discomfort right before she has to have a bowel movement that is relieved with bowel movements.  She has no other abdominal pain.  She has been having some nausea off and on and requesting Phenergan.  She is having 1-2 bowel movements every day.  This been no blood or melena.  She has had no vomiting.  Blood work on 3/18/2023 revealed a hemoglobin 12.4, WBC 6.6 and platelet count of 199,000.  Sodium 137 potassium 4 BUN 15 creatinine 0.85.  Glucose is 271.  AST 30, ALT 31, alkaline phosphatase 68, total bili 0.7.  Lipase is 114.  T4 is 10.4.  A1c is 11.